# Patient Record
Sex: FEMALE | Race: WHITE | Employment: UNEMPLOYED | RURAL
[De-identification: names, ages, dates, MRNs, and addresses within clinical notes are randomized per-mention and may not be internally consistent; named-entity substitution may affect disease eponyms.]

---

## 2018-04-04 ENCOUNTER — TELEPHONE (OUTPATIENT)
Dept: FAMILY MEDICINE CLINIC | Age: 33
End: 2018-04-04

## 2018-04-04 ENCOUNTER — OFFICE VISIT (OUTPATIENT)
Dept: FAMILY MEDICINE CLINIC | Age: 33
End: 2018-04-04

## 2018-04-04 VITALS
DIASTOLIC BLOOD PRESSURE: 48 MMHG | SYSTOLIC BLOOD PRESSURE: 92 MMHG | WEIGHT: 128 LBS | HEIGHT: 62 IN | HEART RATE: 100 BPM | TEMPERATURE: 98.5 F | RESPIRATION RATE: 16 BRPM | OXYGEN SATURATION: 97 % | BODY MASS INDEX: 23.55 KG/M2

## 2018-04-04 DIAGNOSIS — J02.9 SORE THROAT: Primary | ICD-10-CM

## 2018-04-04 LAB
QUICKVUE INFLUENZA TEST: NORMAL
S PYO AG THROAT QL: NEGATIVE
VALID INTERNAL CONTROL?: YES
VALID INTERNAL CONTROL?: YES

## 2018-04-04 RX ORDER — BISMUTH SUBSALICYLATE 262 MG
1 TABLET,CHEWABLE ORAL DAILY
COMMUNITY
End: 2019-06-12

## 2018-04-04 NOTE — PROGRESS NOTES
Reviewed record in preparation for visit and have obtained necessary documentation. Patient did not bring medications to visit for review. Information provided on Advanced Directive, Living Will. Body mass index is 23.41 kg/(m^2).    Health Maintenance Due   Topic Date Due    DTaP/Tdap/Td series (1 - Tdap) 06/17/2008    PAP AKA CERVICAL CYTOLOGY  06/17/2008    Influenza Age 9 to Adult  08/01/2017

## 2018-04-04 NOTE — MR AVS SNAPSHOT
[address] [address] 
[phone] Patient: Ricardo Leonard MRN: [id number] Visit Information Date & Time Provider Department Dept. Phone Encounter #  
 4/4/2018 11:00 AM Omar Ku, MADDY [facility] [phone] [id number] Follow-up Instructions Return if symptoms worsen or fail to improve. Upcoming Health Maintenance Date Due DTaP/Tdap/Td series (1 - Tdap) 6/17/2008 PAP AKA CERVICAL CYTOLOGY 6/17/2008 Influenza Age 5 to Adult 8/1/2017 Allergies as of 4/4/2018  Review Complete On: 4/4/2018 By: Clifton West LPN No Known Allergies Current Immunizations  Reviewed on 9/27/2011 Name Date MMR 7/25/2015 11:15 AM  
  
 Not reviewed this visit You Were Diagnosed With   
  
 Codes Comments Sore throat    -  Primary ICD-10-CM: J02.9 ICD-9-CM: 039 Vitals BP Pulse Temp Resp Height(growth percentile) Weight(growth percentile) 92/48 (BP 1 Location: Left arm, BP Patient Position: Sitting) 100 98.5 °F (36.9 °C) (Oral) 16 5' 2\" (1.575 m) 128 lb (58.1 kg) SpO2 BMI OB Status Smoking Status 97% 23.41 kg/m2 Recent pregnancy Never Smoker Vitals History BMI and BSA Data Body Mass Index Body Surface Area  
 23.41 kg/m 2 1.59 m 2 Preferred Pharmacy Pharmacy Name Phone [address] [address] [phone] Your Updated Medication List  
  
   
This list is accurate as of 4/4/18 11:36 AM.  Always use your most recent med list.  
  
  
  
  
 ibuprofen 800 mg tablet Commonly known as:  MOTRIN Take 1 Tab by mouth every eight (8) hours. multivitamin tablet Commonly known as:  ONE A DAY Take 1 Tab by mouth daily. SUPHEDRINE 30 mg tablet Generic drug:  pseudoephedrine Take  by mouth every four (4) hours as needed. TYLENOL 325 mg tablet Generic drug:  acetaminophen Take  by mouth every four (4) hours as needed. We Performed the Following AMB POC RAPID INFLUENZA TEST [22938 CPT(R)] Follow-up Instructions Return if symptoms worsen or fail to improve. Patient Instructions A Healthy Lifestyle: Care Instructions Your Care Instructions A healthy lifestyle can help you feel good, stay at a healthy weight, and have plenty of energy for both work and play. A healthy lifestyle is something you can share with your whole family. A healthy lifestyle also can lower your risk for serious health problems, such as high blood pressure, heart disease, and diabetes. You can follow a few steps listed below to improve your health and the health of your family. Follow-up care is a key part of your treatment and safety. Be sure to make and go to all appointments, and call your doctor if you are having problems. It's also a good idea to know your test results and keep a list of the medicines you take. How can you care for yourself at home? · Do not eat too much sugar, fat, or fast foods. You can still have dessert and treats now and then. The goal is moderation. · Start small to improve your eating habits. Pay attention to portion sizes, drink less juice and soda pop, and eat more fruits and vegetables. ¨ Eat a healthy amount of food. A 3-ounce serving of meat, for example, is about the size of a deck of cards. Fill the rest of your plate with vegetables and whole grains. ¨ Limit the amount of soda and sports drinks you have every day. Drink more water when you are thirsty. ¨ Eat at least 5 servings of fruits and vegetables every day. It may seem like a lot, but it is not hard to reach this goal. A serving or helping is 1 piece of fruit, 1 cup of vegetables, or 2 cups of leafy, raw vegetables.  Have an apple or some carrot sticks as an afternoon snack instead of a candy bar. Try to have fruits and/or vegetables at every meal. 
· Make exercise part of your daily routine. You may want to start with simple activities, such as walking, bicycling, or slow swimming. Try to be active 30 to 60 minutes every day. You do not need to do all 30 to 60 minutes all at once. For example, you can exercise 3 times a day for 10 or 20 minutes. Moderate exercise is safe for most people, but it is always a good idea to talk to your doctor before starting an exercise program. 
· Keep moving. Ema Mass the lawn, work in the garden, or [employer]. Take the stairs instead of the elevator at work. · If you smoke, quit. People who smoke have an increased risk for heart attack, stroke, cancer, and other lung illnesses. Quitting is hard, but there are ways to boost your chance of quitting tobacco for good. ¨ Use nicotine gum, patches, or lozenges. ¨ Ask your doctor about stop-smoking programs and medicines. ¨ Keep trying. In addition to reducing your risk of diseases in the future, you will notice some benefits soon after you stop using tobacco. If you have shortness of breath or asthma symptoms, they will likely get better within a few weeks after you quit. · Limit how much alcohol you drink. Moderate amounts of alcohol (up to 2 drinks a day for men, 1 drink a day for women) are okay. But drinking too much can lead to liver problems, high blood pressure, and other health problems. Family health If you have a family, there are many things you can do together to improve your health. · Eat meals together as a family as often as possible. · Eat healthy foods. This includes fruits, vegetables, lean meats and dairy, and whole grains. · Include your family in your fitness plan. Most people think of activities such as jogging or tennis as the way to fitness, but there are many ways you and your family can be more active.  Anything that makes you breathe hard and gets your heart pumping is exercise. Here are some tips: 
¨ Walk to do errands or to take your child to school or the bus. ¨ Go for a family bike ride after dinner instead of watching TV. Where can you learn more? Go to http://carla-danie.info/. Enter K028 in the search box to learn more about \"A Healthy Lifestyle: Care Instructions. \" Current as of: May 12, 2017 Content Version: 11.4 © 4935-6120 [employer]. Care instructions adapted under license by [employer] (which disclaims liability or warranty for this information). If you have questions about a medical condition or this instruction, always ask your healthcare professional. [address] any warranty or liability for your use of this information. Rapid Strep Test: About This Test 
What is it? A rapid strep test checks the bacteria in your throat to see if strep is the cause of your sore throat. Why is this test done? It may be done so your doctor can find out right away whether you have strep throat. There is another test for strep, called a throat culture, but that test takes a few days to get the results. How can you prepare for the test? 
You don't need to do anything before you have this test. 
What happens during the test? 
· You will be asked to tilt your head back and open your mouth as wide as possible. · Your doctor will press your tongue down with a flat stick (tongue depressor) and then examine your mouth and throat. · A clean cotton swab will be rubbed over the back of your throat, around your tonsils, and over any red areas or sores to collect a sample. How long does the test take? · The test takes less than a minute. · Results are available in 10 to 15 minutes. Follow-up care is a key part of your treatment and safety.  Be sure to make and go to all appointments, and call your doctor if you are having problems. It's also a good idea to keep a list of the medicines you take. Ask your doctor when you can expect to have your test results. Where can you learn more? Go to http://carla-danie.info/. Enter B356 in the search box to learn more about \"Rapid Strep Test: About This Test.\" Current as of: May 12, 2017 Content Version: 11.4 © 8999-7382 [employer]. Care instructions adapted under license by [employer] (which disclaims liability or warranty for this information). If you have questions about a medical condition or this instruction, always ask your healthcare professional. [address] any warranty or liability for your use of this information. Supportive therapy-Tylenol, increase fluids, warm beverages and throat lozenges. If you continue to feel lightheaded please go to the nearest Emergency Room for possible IV fluids. Introducing [facility]! [facility] introduces Movetis patient portal. Now you can access parts of your medical record, email your doctor's office, and request medication refills online. 1. In your internet browser, go to [email or web address] 2. Click on the First Time User? Click Here link in the Sign In box. You will see the New Member Sign Up page. 3. Enter your Movetis Access Code exactly as it appears below. You will not need to use this code after youve completed the sign-up process. If you do not sign up before the expiration date, you must request a new code. · Movetis Access Code: [email or web address] Expires: 7/3/2018 11:36 AM 
 
4. Enter the last four digits of your Social Security Number (xxxx) and Date of Birth (mm/dd/yyyy) as indicated and click Submit. You will be taken to the next sign-up page. 5. Create a Movetis ID. This will be your Movetis login ID and cannot be changed, so think of one that is secure and easy to remember. 6. Create a ShopWiki password. You can change your password at any time. 7. Enter your Password Reset Question and Answer. This can be used at a later time if you forget your password. 8. Enter your e-mail address. You will receive e-mail notification when new information is available in [address] 9. Click Sign Up. You can now view and download portions of your medical record. 10. Click the Download Summary menu link to download a portable copy of your medical information. If you have questions, please visit the Frequently Asked Questions section of the ShopWiki website. Remember, ShopWiki is NOT to be used for urgent needs. For medical emergencies, dial 911. Now available from your iPhone and Android! Please provide this summary of care documentation to your next provider. Your primary care clinician is listed as Phys Other. If you have any questions after today's visit, please call [phone].

## 2018-04-04 NOTE — PATIENT INSTRUCTIONS
A Healthy Lifestyle: Care Instructions  Your Care Instructions    A healthy lifestyle can help you feel good, stay at a healthy weight, and have plenty of energy for both work and play. A healthy lifestyle is something you can share with your whole family. A healthy lifestyle also can lower your risk for serious health problems, such as high blood pressure, heart disease, and diabetes. You can follow a few steps listed below to improve your health and the health of your family. Follow-up care is a key part of your treatment and safety. Be sure to make and go to all appointments, and call your doctor if you are having problems. It's also a good idea to know your test results and keep a list of the medicines you take. How can you care for yourself at home? · Do not eat too much sugar, fat, or fast foods. You can still have dessert and treats now and then. The goal is moderation. · Start small to improve your eating habits. Pay attention to portion sizes, drink less juice and soda pop, and eat more fruits and vegetables. ¨ Eat a healthy amount of food. A 3-ounce serving of meat, for example, is about the size of a deck of cards. Fill the rest of your plate with vegetables and whole grains. ¨ Limit the amount of soda and sports drinks you have every day. Drink more water when you are thirsty. ¨ Eat at least 5 servings of fruits and vegetables every day. It may seem like a lot, but it is not hard to reach this goal. A serving or helping is 1 piece of fruit, 1 cup of vegetables, or 2 cups of leafy, raw vegetables. Have an apple or some carrot sticks as an afternoon snack instead of a candy bar. Try to have fruits and/or vegetables at every meal.  · Make exercise part of your daily routine. You may want to start with simple activities, such as walking, bicycling, or slow swimming. Try to be active 30 to 60 minutes every day. You do not need to do all 30 to 60 minutes all at once.  For example, you can exercise 3 times a day for 10 or 20 minutes. Moderate exercise is safe for most people, but it is always a good idea to talk to your doctor before starting an exercise program.  · Keep moving. Deadra Rosendo the lawn, work in the garden, or [employer]. Take the stairs instead of the elevator at work. · If you smoke, quit. People who smoke have an increased risk for heart attack, stroke, cancer, and other lung illnesses. Quitting is hard, but there are ways to boost your chance of quitting tobacco for good. ¨ Use nicotine gum, patches, or lozenges. ¨ Ask your doctor about stop-smoking programs and medicines. ¨ Keep trying. In addition to reducing your risk of diseases in the future, you will notice some benefits soon after you stop using tobacco. If you have shortness of breath or asthma symptoms, they will likely get better within a few weeks after you quit. · Limit how much alcohol you drink. Moderate amounts of alcohol (up to 2 drinks a day for men, 1 drink a day for women) are okay. But drinking too much can lead to liver problems, high blood pressure, and other health problems. Family health  If you have a family, there are many things you can do together to improve your health. · Eat meals together as a family as often as possible. · Eat healthy foods. This includes fruits, vegetables, lean meats and dairy, and whole grains. · Include your family in your fitness plan. Most people think of activities such as jogging or tennis as the way to fitness, but there are many ways you and your family can be more active. Anything that makes you breathe hard and gets your heart pumping is exercise. Here are some tips:  ¨ Walk to do errands or to take your child to school or the bus. ¨ Go for a family bike ride after dinner instead of watching TV. Where can you learn more? Go to http://carla-danie.info/. Enter T013 in the search box to learn more about \"A Healthy Lifestyle: Care Instructions. \"  Current as of: May 12, 2017  Content Version: 11.4  © 1426-0698 [employer]. Care instructions adapted under license by [employer] (which disclaims liability or warranty for this information). If you have questions about a medical condition or this instruction, always ask your healthcare professional. [address] any warranty or liability for your use of this information. Rapid Strep Test: About This Test  What is it? A rapid strep test checks the bacteria in your throat to see if strep is the cause of your sore throat. Why is this test done? It may be done so your doctor can find out right away whether you have strep throat. There is another test for strep, called a throat culture, but that test takes a few days to get the results. How can you prepare for the test?  You don't need to do anything before you have this test.  What happens during the test?  · You will be asked to tilt your head back and open your mouth as wide as possible. · Your doctor will press your tongue down with a flat stick (tongue depressor) and then examine your mouth and throat. · A clean cotton swab will be rubbed over the back of your throat, around your tonsils, and over any red areas or sores to collect a sample. How long does the test take? · The test takes less than a minute. · Results are available in 10 to 15 minutes. Follow-up care is a key part of your treatment and safety. Be sure to make and go to all appointments, and call your doctor if you are having problems. It's also a good idea to keep a list of the medicines you take. Ask your doctor when you can expect to have your test results. Where can you learn more? Go to http://carla-danie.info/. Enter B356 in the search box to learn more about \"Rapid Strep Test: About This Test.\"  Current as of: May 12, 2017  Content Version: 11.4  © 2006-2017 [employer].  Care instructions adapted under license by [address] (which disclaims liability or warranty for this information). If you have questions about a medical condition or this instruction, always ask your healthcare professional. [address] any warranty or liability for your use of this information. Supportive therapy-Tylenol, increase fluids, warm beverages and throat lozenges. If you continue to feel lightheaded please go to the nearest Emergency Room for possible IV fluids.

## 2018-04-04 NOTE — PROGRESS NOTES
Drew Patrick  29 y.o. female  1985  [facility] [address]  [id number]     [facility]: Progress Note       Encounter Date: 4/4/2018    Chief Complaint   Patient presents with    Sore Throat     History of Present Illness   Drew Patrick is a 29 y.o. female who presents to clinic today for:  Feeling lightheaded, sore throat, h/a. LMP 3/25/18 and continues to breastfeed. Had a common cold ~1 week ago and got better. OTC-Tylenol and home remedies. She states she had some left over Amoxicillin and she took ~5 doses. Denies-SOB, chest pain (palpitations/arrhymias), abdominal pain, rash, LE edema, vision changes, fainting/falls, dizziness. BP noted-patient states she is eating and drinking. Discussed ED precautions for potential IV fluids. Health Maintenance    Health Maintenance Due   Topic Date Due    DTaP/Tdap/Td series (1 - Tdap) 06/17/2008    PAP AKA CERVICAL CYTOLOGY  06/17/2008    Influenza Age 5 to Adult  08/01/2017     Review of Systems   Review of Systems   Constitutional: Negative. HENT: Positive for sore throat. Eyes: Negative. Respiratory: Negative. Cardiovascular: Negative. Gastrointestinal: Negative. Genitourinary: Negative. Musculoskeletal: Negative. Skin: Negative. Neurological: Positive for headaches. Endo/Heme/Allergies: Negative. Psychiatric/Behavioral: Negative. Vitals/Objective:     Vitals:    04/04/18 1104   BP: 92/48   Pulse: 100   Resp: 16   Temp: 98.5 °F (36.9 °C)   TempSrc: Oral   SpO2: 97%   Weight: 128 lb (58.1 kg)   Height: 5' 2\" (1.575 m)     Body mass index is 23.41 kg/(m^2). Physical Exam   Constitutional: She is oriented to person, place, and time. She appears well-developed and well-nourished. She is active and cooperative. HENT:   Head: Normocephalic. Nose: Nose normal.   Mouth/Throat: Uvula is midline. Mucous membranes are dry. Posterior oropharyngeal erythema present.        Eyes: Conjunctivae, EOM and lids are normal. Pupils are equal, round, and reactive to light. Neck: Trachea normal, normal range of motion, full passive range of motion without pain and phonation normal. Neck supple. No thyromegaly present. Cardiovascular: Normal rate, regular rhythm, S1 normal and normal pulses. Pulmonary/Chest: Effort normal and breath sounds normal.   Musculoskeletal: Normal range of motion. Lymphadenopathy:        Head (right side): No tonsillar adenopathy present. Head (left side): No tonsillar adenopathy present. Neurological: She is alert and oriented to person, place, and time. Skin: Skin is warm, dry and intact. Psychiatric: She has a normal mood and affect. Her speech is normal and behavior is normal. Judgment and thought content normal. Cognition and memory are normal.       Recent Results (from the past 24 hour(s))   AMB POC RAPID INFLUENZA TEST    Collection Time: 18 11:15 AM   Result Value Ref Range    VALID INTERNAL CONTROL POC Yes     QuickVue Influenza test Negative Negative     Assessment and Plan:   1. Sore throat    Rapid STREP negative. Discussed supportive therapy, fluids, throat lozenges, and tylenol. Discussed ED precautions for potential volume due to BP. I have discussed the diagnosis with the patient and the intended plan as seen in the above orders. she has expressed understanding. The patient has received an after-visit summary and questions were answered concerning future plans. I have discussed medication side effects and warnings with the patient as well. Electronically Signed: Brian Ohara NP     History/Allergies   Patients past medical, surgical and family histories were reviewed and updated. History reviewed. No pertinent past medical history.    Past Surgical History:   Procedure Laterality Date     DELIVERY ONLY      DELIVERY   07     Family History   Problem Relation Age of Onset    Migraines Maternal Uncle     Migraines Maternal Grandmother      Social History     Social History    Marital status:      Spouse name: N/A    Number of children: N/A    Years of education: N/A     Occupational History    Not on file. Social History Main Topics    Smoking status: Never Smoker    Smokeless tobacco: Never Used    Alcohol use No    Drug use: No    Sexual activity: Yes     Partners: Male     Birth control/ protection: None     Other Topics Concern    Not on file     Social History Narrative         No Known Allergies    Disposition     Follow-up Disposition:  Return if symptoms worsen or fail to improve. No future appointments. Current Medications after this visit     Current Outpatient Prescriptions   Medication Sig    multivitamin (ONE A DAY) tablet Take 1 Tab by mouth daily.  acetaminophen (TYLENOL) 325 mg tablet Take  by mouth every four (4) hours as needed.  ibuprofen (MOTRIN) 800 mg tablet Take 1 Tab by mouth every eight (8) hours.  pseudoephedrine (SUPHEDRINE) 30 mg tablet Take  by mouth every four (4) hours as needed. No current facility-administered medications for this visit. There are no discontinued medications.

## 2018-04-04 NOTE — TELEPHONE ENCOUNTER
Returned patient's call. She was concerned that an infection may be the cause of her low BP. Informed patient that should not be the case.  Advised patient to be sure to push fluids as instructed by Deena Gates NP.

## 2018-04-04 NOTE — TELEPHONE ENCOUNTER
----- Message from Dalia Weiss sent at 4/4/2018 12:54 PM EDT -----  Regarding: Dr. Jerrell Uribe / Telephone  Pt is requesting to speak to the nurse regarding her visit today.  Best contact: [phone]

## 2019-05-31 ENCOUNTER — OFFICE VISIT (OUTPATIENT)
Dept: FAMILY MEDICINE CLINIC | Age: 34
End: 2019-05-31

## 2019-05-31 VITALS
OXYGEN SATURATION: 97 % | HEIGHT: 62 IN | SYSTOLIC BLOOD PRESSURE: 97 MMHG | BODY MASS INDEX: 25.4 KG/M2 | DIASTOLIC BLOOD PRESSURE: 64 MMHG | HEART RATE: 77 BPM | WEIGHT: 138 LBS | TEMPERATURE: 98.7 F | RESPIRATION RATE: 16 BRPM

## 2019-05-31 DIAGNOSIS — S90.112A CONTUSION OF LEFT GREAT TOE WITHOUT DAMAGE TO NAIL, INITIAL ENCOUNTER: Primary | ICD-10-CM

## 2019-05-31 DIAGNOSIS — S99.922A INJURY OF TOE ON LEFT FOOT, INITIAL ENCOUNTER: ICD-10-CM

## 2019-05-31 LAB
HCG URINE, QL. (POC): NEGATIVE
VALID INTERNAL CONTROL?: YES

## 2019-05-31 RX ORDER — DICLOFENAC SODIUM 50 MG/1
50 TABLET, DELAYED RELEASE ORAL 2 TIMES DAILY
Qty: 14 TAB | Refills: 0 | Status: SHIPPED | OUTPATIENT
Start: 2019-05-31 | End: 2019-06-07

## 2019-05-31 NOTE — PROGRESS NOTES
[facility]idency Program     Outpatient Resident Progress Note    Encounter Date: 5/31/2019    Chief Complaint   Patient presents with    Toe Injury     left great toe       History of Present Illness    Patient is a 34 y.o. female, who presents to clinic for Toe Injury (left great toe)  Patient comes to the clinic after injuring the big toe of the left foot playing soccer Yesterday (5/30/2019). She received an impact by another player on the medial aspect of the toe. At the moment of the trauma, patient felt a pain of 10/10, Today is 7/10. She have been using Tylenol for pain relieve with mild effect. She have noticed swelling and brusing, but no redness, laceration, loss of movement, changes in sensation. Denies rash, fever, fatigue, dizziness, lightheadedness, headaches, changes in vision,CP, palpitations, SOB, nausea, vomiting, or any other complains at this moment. Review of Systems    A complete ROS was reviewed and only pertinent items documented on HPI. Allergies - reviewed:   No Known Allergies    Medications - reviewed:   Current Outpatient Medications   Medication Sig    diclofenac EC (VOLTAREN) 50 mg EC tablet Take 1 Tab by mouth two (2) times a day for 7 days.  multivitamin (ONE A DAY) tablet Take 1 Tab by mouth daily.  ibuprofen (MOTRIN) 800 mg tablet Take 1 Tab by mouth every eight (8) hours.  pseudoephedrine (SUPHEDRINE) 30 mg tablet Take  by mouth every four (4) hours as needed.  acetaminophen (TYLENOL) 325 mg tablet Take  by mouth every four (4) hours as needed. No current facility-administered medications for this visit. Past Medical History - reviewed:  History reviewed. No pertinent past medical history.     Family Medical History - reviewed:  Family History   Problem Relation Age of Onset    Migraines Maternal Uncle     Migraines Maternal Grandmother        Objective  Visit Vitals  BP 97/64 (BP 1 Location: Left arm, BP Patient Position: Sitting)   Pulse 77   Temp 98.7 °F (37.1 °C) (Oral)   Resp 16   Ht 5' 2\" (1.575 m)   Wt 138 lb (62.6 kg)   SpO2 97%   BMI 25.24 kg/m²     Body mass index is 25.24 kg/m². Nursing note and vitals reviewed. Physical Exam  Constitutional: Well-developed, well-nourished, and in no distress. Cardiovascular: Normal rate, regular rhythm, normal heart sounds and intact distal pulses. Exam reveals no gallop and no friction rub. No murmur heard. Pulmonary/Chest: Effort normal and breath sounds normal. No respiratory distress. No wheezes, no rales, no tenderness. Musculoskeletal: L big toe: Limited ROM of L big toe due to pain. Normal sensation and strength. Ecchymosis of the medial aspect of the distal toe with related tenderness and mild edema. No lacerations or erythema. Neurological: Alert and oriented to person, place, and time. Normal reflexes. No cranial nerve deficit. Gait normal. Coordination normal.   Skin: Skin is warm and dry. No rash noted. Not diaphoretic. No erythema. No pallor. POC UPT (-)    XR of the L Foot: No acute abnormalities. Will follow up Radiologist interpretation. Assessment / Plan   Ms. Kandis Paz is a 34 y.o. female with the following medical condition(s):    1. Contusion of left great toe without damage to nail, initial encounter  No evidence of fracture of the fallax of the L greater toe on XR. Recommended use of NSAID for pain relieve and inflammation reduction. - diclofenac EC (VOLTAREN) 50 mg EC tablet; Take 1 Tab by mouth two (2) times a day for 7 days. Dispense: 14 Tab; Refill: 0  - XR FOOT LT MIN 3 V; Future  - AMB POC URINE PREGNANCY TEST, VISUAL COLOR COMPARISON  - diclofenac EC (VOLTAREN) 50 mg EC tablet; Take 1 Tab by mouth two (2) times a day for 7 days. Dispense: 14 Tab; Refill: 0      Patient was advised to return to clinic in case of worsening of symptoms or fail to improve.  Life threatening signs and symptoms were discussed and patient advised to go to the nearest ED for prompt evaluation and care in case of onset of any of these. · I have discussed the diagnosis with the patient and the intended plan as seen in the above orders. The patient has received an after-visit summary and questions were answered concerning future plans. I have discussed medication side effects and warnings with the patient as well.       Patient/Plan discussed with Dr. Nilam Victor (Attending Physician)      Rosa Fabian MD  PGY-3 Family Medicine Resident  Encounter Date: 5/31/2019

## 2019-05-31 NOTE — PROGRESS NOTES
1. Have you been to the ER, urgent care clinic since your last visit? Hospitalized since your last visit? No    2. Have you seen or consulted any other health care providers outside of the [address] [place] [address] since your last visit? Include any pap smears or colon screening.  No  Reviewed record in preparation for visit and have necessary documentation  Pt did not bring medication to office visit for review    Goals that were addressed and/or need to be completed during or after this appointment include   Health Maintenance Due   Topic Date Due    DTaP/Tdap/Td series (1 - Tdap) 06/17/2008    PAP AKA CERVICAL CYTOLOGY  06/17/2008

## 2019-05-31 NOTE — PATIENT INSTRUCTIONS
Contusion: Care Instructions  Your Care Instructions    Contusion is the medical term for a bruise. It is the result of a direct blow or an impact, such as a fall. Contusions are common sports injuries. Most people think of a bruise as a black-and-blue spot. This happens when small blood vessels get torn and leak blood under the skin. But bones, muscles, and organs can also get bruised. This may damage deep tissues but not cause a bruise you can see. The doctor will do a physical exam to find the location of your contusion. You may also have tests to make sure you do not have a more serious injury, such as a broken bone or nerve damage. These may include X-rays or other imaging tests like a CT scan or MRI. Deep-tissue contusions may cause pain and swelling. But if there is no serious damage, they will often get better in a few weeks with home treatment. The doctor has checked you carefully, but problems can develop later. If you notice any problems or new symptoms, get medical treatment right away. Follow-up care is a key part of your treatment and safety. Be sure to make and go to all appointments, and call your doctor if you are having problems. It's also a good idea to know your test results and keep a list of the medicines you take. How can you care for yourself at home? · Put ice or a cold pack on the sore area for 10 to 20 minutes at a time to stop swelling. Put a thin cloth between the ice pack and your skin. · Be safe with medicines. Read and follow all instructions on the label. ? If the doctor gave you a prescription medicine for pain, take it as prescribed. ? If you are not taking a prescription pain medicine, ask your doctor if you can take an over-the-counter medicine. · If you can, prop up the sore area on pillows as much as possible for the next few days. Try to keep the sore area above the level of your heart. When should you call for help?   Call your doctor now or seek immediate medical care if:    · Your pain gets worse.     · You have new or worse swelling.     · You have tingling, weakness, or numbness in the area near the contusion.     · The area near the contusion is cold or pale.    Watch closely for changes in your health, and be sure to contact your doctor if:    · You do not get better as expected. Where can you learn more? Go to http://carla-danie.info/. Enter J188 in the search box to learn more about \"Contusion: Care Instructions. \"  Current as of: September 23, 2018  Content Version: 11.9  © 5870-6917 [employer]. Care instructions adapted under license by [employer] (which disclaims liability or warranty for this information). If you have questions about a medical condition or this instruction, always ask your healthcare professional. [address] any warranty or liability for your use of this information.

## 2019-06-27 ENCOUNTER — OFFICE VISIT (OUTPATIENT)
Dept: FAMILY MEDICINE CLINIC | Age: 34
End: 2019-06-27

## 2019-06-27 VITALS
BODY MASS INDEX: 24.11 KG/M2 | RESPIRATION RATE: 16 BRPM | HEART RATE: 76 BPM | TEMPERATURE: 97.2 F | HEIGHT: 62 IN | WEIGHT: 131 LBS | DIASTOLIC BLOOD PRESSURE: 66 MMHG | SYSTOLIC BLOOD PRESSURE: 96 MMHG

## 2019-06-27 DIAGNOSIS — R39.9 URINARY SYMPTOM OR SIGN: Primary | ICD-10-CM

## 2019-06-27 LAB
BILIRUB UR QL STRIP: NEGATIVE
GLUCOSE UR-MCNC: NEGATIVE MG/DL
KETONES P FAST UR STRIP-MCNC: NEGATIVE MG/DL
PH UR STRIP: 7 [PH] (ref 4.6–8)
PROT UR QL STRIP: NORMAL
SP GR UR STRIP: 1.01 (ref 1–1.03)
UA UROBILINOGEN AMB POC: NORMAL (ref 0.2–1)
URINALYSIS CLARITY POC: CLEAR
URINALYSIS COLOR POC: NORMAL
URINE BLOOD POC: NORMAL
URINE LEUKOCYTES POC: NEGATIVE
URINE NITRITES POC: NEGATIVE

## 2019-06-27 NOTE — PROGRESS NOTES
Subjective  CC: Mary Ruano is an 30 y.o. female presents for urinary concern    She states that she is having an increase in urination x4 days. She had burning x1 day (start of symptoms) but has not had that any in the last couple of days. She has not had fevers, back pain. Has not noticed blood in the urine. Allergies - reviewed:   No Known Allergies      Medications - reviewed:   No current outpatient medications on file. No current facility-administered medications for this visit. Past Medical History - reviewed:  History reviewed. No pertinent past medical history. Immunizations - reviewed:   Immunization History   Administered Date(s) Administered    MMR 07/25/2015         ROS  Review of Systems : A review of systems was performed. All pertinent negatives and positives are mentioned in the HPI. Physical Exam  Visit Vitals  BP 96/66 (BP 1 Location: Left arm, BP Patient Position: Sitting)   Pulse 76   Temp 97.2 °F (36.2 °C) (Oral)   Resp 16   Ht 5' 2\" (1.575 m)   Wt 131 lb (59.4 kg)   BMI 23.96 kg/m²       General appearance - Alert, NAD. Head: Atraumatic. Normocephalic. No lymphadenopathy  Eyes: EOMI. Sclera white. Respiratory - LCTAB. No wheeze/rale/rhonchi  Heart - Normal rate, regular rhythm. No m/r/r  Abdomen - Soft, non tender. No suprapubic tenderness. Non distended. No CVA tenderness  Neurological - No focal deficits. Speech normal.   Extremities - No LE edema. Distal pulses intact  Skin - normal coloration and normal turgor. No cyanosis, no rash. Assessment/Plan  1. Urinary symptom or sign - UA with blood, pt currently menstruating, otherwise negative for signs of infection. Could possibly 2/2 to primary polydipsia. Advised pt to monitor for signs of UTI including dysuria, odor, blood, fever etc.  - AMB POC URINALYSIS DIP STICK AUTO W/O MICRO      I have discussed the aforementioned diagnoses and plan with the patient in detail.  I have provided information in person and/or in AVS. All questions answered prior to discharge.     Cleve Melendrez MD  Family Medicine Resident

## 2019-06-27 NOTE — PROGRESS NOTES
I discussed the findings, assessment and plan in detail with the resident and agree with the resident's findings and plan as documented in the resident's note. Talia Ang M.D.

## 2019-06-27 NOTE — PATIENT INSTRUCTIONS
Monitor for signs of infection including burning with urination, blood in the urine, odor, or vaginal discharge. Urinary Tract Infection in Women: Care Instructions  Your Care Instructions    A urinary tract infection, or UTI, is a general term for an infection anywhere between the kidneys and the urethra (where urine comes out). Most UTIs are bladder infections. They often cause pain or burning when you urinate. UTIs are caused by bacteria and can be cured with antibiotics. Be sure to complete your treatment so that the infection goes away. Follow-up care is a key part of your treatment and safety. Be sure to make and go to all appointments, and call your doctor if you are having problems. It's also a good idea to know your test results and keep a list of the medicines you take. How can you care for yourself at home? · Take your antibiotics as directed. Do not stop taking them just because you feel better. You need to take the full course of antibiotics. · Drink extra water and other fluids for the next day or two. This may help wash out the bacteria that are causing the infection. (If you have kidney, heart, or liver disease and have to limit fluids, talk with your doctor before you increase your fluid intake.)  · Avoid drinks that are carbonated or have caffeine. They can irritate the bladder. · Urinate often. Try to empty your bladder each time. · To relieve pain, take a hot bath or lay a heating pad set on low over your lower belly or genital area. Never go to sleep with a heating pad in place. To prevent UTIs  · Drink plenty of water each day. This helps you urinate often, which clears bacteria from your system. (If you have kidney, heart, or liver disease and have to limit fluids, talk with your doctor before you increase your fluid intake.)  · Urinate when you need to. · Urinate right after you have sex. · Change sanitary pads often.   · Avoid douches, bubble baths, feminine hygiene sprays, and other feminine hygiene products that have deodorants. · After going to the bathroom, wipe from front to back. When should you call for help? Call your doctor now or seek immediate medical care if:    · Symptoms such as fever, chills, nausea, or vomiting get worse or appear for the first time.     · You have new pain in your back just below your rib cage. This is called flank pain.     · There is new blood or pus in your urine.     · You have any problems with your antibiotic medicine.    Watch closely for changes in your health, and be sure to contact your doctor if:    · You are not getting better after taking an antibiotic for 2 days.     · Your symptoms go away but then come back. Where can you learn more? Go to http://carla-danie.info/. Enter A020 in the search box to learn more about \"Urinary Tract Infection in Women: Care Instructions. \"  Current as of: March 20, 2018  Content Version: 11.9  © 4412-2327 [employer], [employer]. Care instructions adapted under license by [employer] (which disclaims liability or warranty for this information). If you have questions about a medical condition or this instruction, always ask your healthcare professional. [address] any warranty or liability for your use of this information.

## 2019-06-27 NOTE — PROGRESS NOTES
1. Have you been to the ER, urgent care clinic since your last visit? Hospitalized since your last visit? No    2. Have you seen or consulted any other health care providers outside of the [address] [place] [address] since your last visit? Include any pap smears or colon screening.  No  Reviewed record in preparation for visit and have necessary documentation  Pt did not bring medication to office visit for review    Goals that were addressed and/or need to be completed during or after this appointment include   Health Maintenance Due   Topic Date Due    DTaP/Tdap/Td series (1 - Tdap) 06/17/2008    PAP AKA CERVICAL CYTOLOGY  06/17/2008

## 2019-07-09 ENCOUNTER — DOCUMENTATION ONLY (OUTPATIENT)
Dept: FAMILY MEDICINE CLINIC | Age: 34
End: 2019-07-09

## 2020-02-12 RX ORDER — OSELTAMIVIR PHOSPHATE 75 MG/1
75 CAPSULE ORAL DAILY
Qty: 10 CAP | Refills: 0 | Status: SHIPPED | OUTPATIENT
Start: 2020-02-12 | End: 2020-02-22

## 2020-03-06 ENCOUNTER — OFFICE VISIT (OUTPATIENT)
Dept: FAMILY MEDICINE CLINIC | Age: 35
End: 2020-03-06

## 2020-03-06 ENCOUNTER — HOSPITAL ENCOUNTER (OUTPATIENT)
Dept: LAB | Age: 35
Discharge: HOME | End: 2020-03-06

## 2020-03-06 VITALS
SYSTOLIC BLOOD PRESSURE: 110 MMHG | HEART RATE: 103 BPM | TEMPERATURE: 98.8 F | RESPIRATION RATE: 16 BRPM | HEIGHT: 62 IN | WEIGHT: 145 LBS | BODY MASS INDEX: 26.68 KG/M2 | OXYGEN SATURATION: 98 % | DIASTOLIC BLOOD PRESSURE: 59 MMHG

## 2020-03-06 DIAGNOSIS — R35.0 FREQUENCY OF URINATION: ICD-10-CM

## 2020-03-06 DIAGNOSIS — R35.0 FREQUENCY OF URINATION: Primary | ICD-10-CM

## 2020-03-06 LAB
ALBUMIN SERPL-MCNC: 4 G/DL (ref 3.5–5)
ALBUMIN/GLOB SERPL: 1.3 {RATIO} (ref 1.1–2.2)
ALP SERPL-CCNC: 54 U/L (ref 45–117)
ALT SERPL-CCNC: 19 U/L (ref 12–78)
ANION GAP SERPL CALC-SCNC: 2 MMOL/L (ref 5–15)
AST SERPL-CCNC: 6 U/L (ref 15–37)
BILIRUB SERPL-MCNC: 0.4 MG/DL (ref 0.2–1)
BILIRUB UR QL STRIP: NEGATIVE
BUN SERPL-MCNC: 12 MG/DL (ref 6–20)
BUN/CREAT SERPL: 17 (ref 12–20)
CALCIUM SERPL-MCNC: 9.3 MG/DL (ref 8.5–10.1)
CHLORIDE SERPL-SCNC: 107 MMOL/L (ref 97–108)
CO2 SERPL-SCNC: 28 MMOL/L (ref 21–32)
CREAT SERPL-MCNC: 0.7 MG/DL (ref 0.55–1.02)
GLOBULIN SER CALC-MCNC: 3.2 G/DL (ref 2–4)
GLUCOSE SERPL-MCNC: 74 MG/DL (ref 65–100)
GLUCOSE UR-MCNC: NEGATIVE MG/DL
KETONES P FAST UR STRIP-MCNC: NORMAL MG/DL
PH UR STRIP: 5.5 [PH] (ref 4.6–8)
POTASSIUM SERPL-SCNC: 4 MMOL/L (ref 3.5–5.1)
PROT SERPL-MCNC: 7.2 G/DL (ref 6.4–8.2)
PROT UR QL STRIP: NEGATIVE
SODIUM SERPL-SCNC: 137 MMOL/L (ref 136–145)
SP GR UR STRIP: 1.03 (ref 1–1.03)
UA UROBILINOGEN AMB POC: NORMAL (ref 0.2–1)
URINALYSIS CLARITY POC: CLEAR
URINALYSIS COLOR POC: YELLOW
URINE BLOOD POC: NEGATIVE
URINE LEUKOCYTES POC: NORMAL
URINE NITRITES POC: NEGATIVE

## 2020-03-06 NOTE — PROGRESS NOTES
Subjective  CC: Reena Ward is an 30 y.o. female increased urination. Patient presents for dysuria. Symptoms started a couple of weeks. Urinary urgency: not present  Urinary frequency: present    Denies increase in thirst or increase in appetite. No blood in urine, cloudiness, odor, N/V/D. History of UTI?: has not had UTI in recent past    Allergies - reviewed:   No Known Allergies      Medications - reviewed:   No current outpatient medications on file. No current facility-administered medications for this visit. Past Medical History - reviewed:  History reviewed. No pertinent past medical history. Immunizations - reviewed:   Immunization History   Administered Date(s) Administered    MMR 07/25/2015         ROS  Review of Systems : A complete review of systems as performed and is negative except for those mentioned in the HPI. Physical Exam  Visit Vitals  /59 (BP 1 Location: Left arm, BP Patient Position: Sitting)   Pulse (!) 103   Temp 98.8 °F (37.1 °C) (Oral)   Resp 16   Ht 5' 2\" (1.575 m)   Wt 145 lb (65.8 kg)   SpO2 98%   BMI 26.52 kg/m²       General appearance - Alert, NAD. Head: Atraumatic. Normocephalic. No lymphadenopathy  Eyes: EOMI. Sclera white. Throat: pharynx clear, no exudates. Respiratory - LCTAB. No wheeze/rale/rhonchi  Heart - Normal rate, regular rhythm. No m/r/r  Abdomen - Soft, non tender. Non distended. No suprapubic tenderness or CVA tenderness. Extremities - No LE edema. Distal pulses intact  Skin - normal coloration and normal turgor. No cyanosis, no rash. Assessment/Plan  1. Frequency of urination - UA with presence of 1+ LE. Discussed with pt that this alone does not indicatively rule in UTI. Will send for culture. Other etiologies for increase in urination include DM and bladder irritability. Well check CMP to evaluate renal function and glucose level (pt had breakfast). Have discussed urinary symptom treatment with uristat.   - CULTURE, URINE; Future  - AMB POC URINALYSIS DIP STICK AUTO W/O MICRO  - METABOLIC PANEL, COMPREHENSIVE; Future      I discussed the aforementioned diagnoses with the patient as well as the plan of care.      Paul Skelton MD  Family Medicine Resident  PGY 3

## 2020-03-06 NOTE — PATIENT INSTRUCTIONS
Frequent Urination: Care Instructions Your Care Instructions An urge to urinate frequently but usually passing only small amounts of urine is a common symptom of urinary problems, such as urinary tract infections. The bladder may become inflamed. This can cause the urge to urinate. You may try to urinate more often than usual to try to soothe that urge. Frequent urination also may be caused by sexually transmitted infections (STIs) or kidney stones. Or it may happen when something irritates the tube that carries urine from the bladder to the outside of the body (urethra). It may also be a sign of diabetes. The cause may be hard to find. You may need tests. Follow-up care is a key part of your treatment and safety. Be sure to make and go to all appointments, and call your doctor if you are having problems. It's also a good idea to know your test results and keep a list of the medicines you take. How can you care for yourself at home? · Drink extra water for the next day or two. This will help make the urine less concentrated. (If you have kidney, heart, or liver disease and have to limit fluids, talk with your doctor before you increase the amount of fluids you drink.) · Avoid drinks that are carbonated or have caffeine. They can irritate the bladder. For women: · Urinate right after you have sex. · After you go to the bathroom, wipe from front to back. · Avoid douches, bubble baths, and feminine hygiene sprays. And avoid other feminine hygiene products that have deodorants. When should you call for help? Call your doctor now or seek immediate medical care if: 
  · You have new symptoms, such as fever, nausea, or vomiting.  
  · You have new or worse symptoms of a urinary problem. For example: ? You have blood or pus in your urine. ? You have chills or body aches. ? It hurts to urinate. ? You have groin or belly pain. ? You have pain in your back just below your rib cage (the flank area).  Watch closely for changes in your health, and be sure to contact your doctor if you feel thirstier than usual. 
Where can you learn more? Go to http://carla-danie.info/. Enter 707 9441 in the search box to learn more about \"Frequent Urination: Care Instructions. \" Current as of: December 19, 2018 Content Version: 12.2 © 3070-1844 [employer]. Care instructions adapted under license by [employer] (which disclaims liability or warranty for this information). If you have questions about a medical condition or this instruction, always ask your healthcare professional. [place] [address] any warranty or liability for your use of this information.

## 2020-03-06 NOTE — PROGRESS NOTES
1. Have you been to the ER, urgent care clinic since your last visit? Hospitalized since your last visit? No    2. Have you seen or consulted any other health care providers outside of the [address] [place] [address] since your last visit? Include any pap smears or colon screening.  No  Reviewed record in preparation for visit and have necessary documentation  Pt did not bring medication to office visit for review    Goals that were addressed and/or need to be completed during or after this appointment include   Health Maintenance Due   Topic Date Due    DTaP/Tdap/Td series (1 - Tdap) 06/17/1998    Influenza Age 5 to Adult  08/01/2019

## 2020-03-08 LAB
BACTERIA SPEC CULT: NORMAL
SERVICE CMNT-IMP: NORMAL

## 2020-08-14 ENCOUNTER — OFFICE VISIT (OUTPATIENT)
Dept: FAMILY MEDICINE CLINIC | Age: 35
End: 2020-08-14

## 2020-08-14 ENCOUNTER — HOSPITAL ENCOUNTER (OUTPATIENT)
Dept: LAB | Age: 35
Discharge: HOME | End: 2020-08-14

## 2020-08-14 VITALS
BODY MASS INDEX: 27.23 KG/M2 | SYSTOLIC BLOOD PRESSURE: 105 MMHG | TEMPERATURE: 97.7 F | OXYGEN SATURATION: 100 % | HEART RATE: 99 BPM | DIASTOLIC BLOOD PRESSURE: 64 MMHG | WEIGHT: 148 LBS | RESPIRATION RATE: 20 BRPM | HEIGHT: 62 IN

## 2020-08-14 DIAGNOSIS — N94.9 VAGINAL BURNING: ICD-10-CM

## 2020-08-14 DIAGNOSIS — R35.0 URINARY FREQUENCY: ICD-10-CM

## 2020-08-14 DIAGNOSIS — Z00.00 WELLNESS EXAMINATION: ICD-10-CM

## 2020-08-14 DIAGNOSIS — Z00.00 WELLNESS EXAMINATION: Primary | ICD-10-CM

## 2020-08-14 LAB
APPEARANCE UR: CLEAR
BACTERIA URNS QL MICRO: NEGATIVE /HPF
BILIRUB UR QL: NEGATIVE
CHOLEST SERPL-MCNC: 138 MG/DL
COLOR UR: ABNORMAL
EPITH CASTS URNS QL MICRO: ABNORMAL /LPF
ERYTHROCYTE [DISTWIDTH] IN BLOOD BY AUTOMATED COUNT: 12.8 % (ref 11.5–14.5)
GLUCOSE UR STRIP.AUTO-MCNC: NEGATIVE MG/DL
HCT VFR BLD AUTO: 41.9 % (ref 35–47)
HDLC SERPL-MCNC: 58 MG/DL
HDLC SERPL: 2.4 {RATIO} (ref 0–5)
HGB BLD-MCNC: 12.8 G/DL (ref 11.5–16)
HGB UR QL STRIP: NEGATIVE
HYALINE CASTS URNS QL MICRO: ABNORMAL /LPF (ref 0–5)
KETONES UR QL STRIP.AUTO: NEGATIVE MG/DL
LDLC SERPL CALC-MCNC: 70 MG/DL (ref 0–100)
LEUKOCYTE ESTERASE UR QL STRIP.AUTO: NEGATIVE
LIPID PROFILE,FLP: NORMAL
MCH RBC QN AUTO: 29.2 PG (ref 26–34)
MCHC RBC AUTO-ENTMCNC: 30.5 G/DL (ref 30–36.5)
MCV RBC AUTO: 95.7 FL (ref 80–99)
NITRITE UR QL STRIP.AUTO: NEGATIVE
NRBC # BLD: 0 K/UL (ref 0–0.01)
NRBC BLD-RTO: 0 PER 100 WBC
PH UR STRIP: 8.5 [PH] (ref 5–8)
PLATELET # BLD AUTO: 277 K/UL (ref 150–400)
PMV BLD AUTO: 11.1 FL (ref 8.9–12.9)
PROT UR STRIP-MCNC: NEGATIVE MG/DL
RBC # BLD AUTO: 4.38 M/UL (ref 3.8–5.2)
RBC #/AREA URNS HPF: ABNORMAL /HPF (ref 0–5)
SP GR UR REFRACTOMETRY: 1.01 (ref 1–1.03)
TRIGL SERPL-MCNC: 50 MG/DL (ref ?–150)
UROBILINOGEN UR QL STRIP.AUTO: 0.2 EU/DL (ref 0.2–1)
VLDLC SERPL CALC-MCNC: 10 MG/DL
WBC # BLD AUTO: 5.4 K/UL (ref 3.6–11)
WBC URNS QL MICRO: ABNORMAL /HPF (ref 0–4)

## 2020-08-14 PROCEDURE — 99395 PREV VISIT EST AGE 18-39: CPT | Performed by: STUDENT IN AN ORGANIZED HEALTH CARE EDUCATION/TRAINING PROGRAM

## 2020-08-14 PROCEDURE — 88175 CYTOPATH C/V AUTO FLUID REDO: CPT

## 2020-08-14 PROCEDURE — 87624 HPV HI-RISK TYP POOLED RSLT: CPT

## 2020-08-14 NOTE — PROGRESS NOTES
1. Have you been to the ER, urgent care clinic since your last visit? Hospitalized since your last visit? No    2. Have you seen or consulted any other health care providers outside of the [address] [place] [address] since your last visit? Include any pap smears or colon screening. No    Reviewed record in preparation for visit and have necessary documentation  Goals that were addressed and/or need to be completed during or after this appointment include     Health Maintenance Due   Topic Date Due    DTaP/Tdap/Td series (1 - Tdap) 06/17/2008    Influenza Age 5 to Adult  08/01/2020       Patient is accompanied by self I have received verbal consent from Atif Zuleta to discuss any/all medical information while they are present in the room.

## 2020-08-14 NOTE — PATIENT INSTRUCTIONS

## 2020-08-14 NOTE — PROGRESS NOTES
ROUTINE ANNUAL WELL WOMAN    Sreedhar Ureña is a 37 y.o. female who presents to clinic today for annual physical exam.  She complains of irritation/itching/ burning sensation in her vaginal area that started last week Thursday. She used over the [employer], but that caused more irritation, so she stopped using it. She denies bloody vaginal discharge, dysuria, hematuria, abdominal pain, fever, shortness of breath, chest pain, changes in bowel habit. She endorses urinary frequency and urgency. Patient also complains of having a bump in her vaginal area, which she noticed a couple of days ago. Pt denies any history of STI's or STD's. Gyn History     Last menstrual period was: Today (2020)  Length of periods: 4-5 days  Number of days between periods: 28 days  Menstrual flow: Normal flow  Sexually active: Yes  Method of family planning: Patient is not on any birth control. Any family history of gyn cancers (breast, ovarian or cervical ca):  None    Past Medical History: None    Medications: None    Surgical History  Past Surgical History:   Procedure Laterality Date     DELIVERY ONLY      DELIVERY   07       Family History   Family History   Problem Relation Age of Onset    Migraines Maternal Uncle     Migraines Maternal Grandmother        Social History  Social History     Socioeconomic History    Marital status:      Spouse name: Not on file    Number of children: Not on file    Years of education: Not on file    Highest education level: Not on file   Occupational History    Not on file   Social Needs    Financial resource strain: Not on file    Food insecurity     Worry: Not on file     Inability: Not on file    Transportation needs     Medical: Not on file     Non-medical: Not on file   Tobacco Use    Smoking status: Never Smoker    Smokeless tobacco: Never Used   Substance and Sexual Activity    Alcohol use: No    Drug use: No    Sexual activity: Yes     Partners: Male     Birth control/protection: None   Lifestyle    Physical activity     Days per week: Not on file     Minutes per session: Not on file    Stress: Not on file   Relationships    Social connections     Talks on phone: Not on file     Gets together: Not on file     Attends Sabianism service: Not on file     Active member of club or organization: Not on file     Attends meetings of clubs or organizations: Not on file     Relationship status: Not on file    Intimate partner violence     Fear of current or ex partner: Not on file     Emotionally abused: Not on file     Physically abused: Not on file     Forced sexual activity: Not on file   Other Topics Concern    Not on file   Social History Narrative    Not on file     Patient denies any tobacco, alcohol or illicit drug use. Occupation: Stay at home mom       Objective   Vital Signs  Visit Vitals  /64 (BP 1 Location: Right arm, BP Patient Position: Sitting)   Pulse 99   Temp 97.7 °F (36.5 °C) (Oral)   Resp 20   Ht 5' 2\" (1.575 m)   Wt 148 lb (67.1 kg)   SpO2 100%   BMI 27.07 kg/m²       PHYSICAL EXAM   GEN: No apparent distress. Alert and oriented and responds to all questions appropriately. EYES:  Conjunctiva clear; pupils round and reactive to light; extraocular movements are intact. OROPHYARYNX: No oral lesions or exudates. NECK:  Supple; no masses; thyroid normal           LUNGS: Respirations unlabored; clear to auscultation bilaterally  CARDIOVASCULAR: Regular, rate, and rhythm without murmurs, gallops or rubs   ABDOMEN: Soft; nontender; nondistended; normoactive bowel sounds; no masses or organomegaly  PELVIC: normal external genitalia, vulva, cervix, minimal blood in the vaginal area, minimal vaginal tissue protrusion. NEUROLOGIC: No focal neurologic deficits. Coordination and gait grossly intact. EXT: Well perfused. No edema. SKIN: No obvious rashes    A/P    1.  Well woman visit:  Patient's last pap smear with HPV testing was in 2017. Patient would like another pap smear today due to concern for abnormality in her vaginal area. Pelvic exam showed normal external genitalia, vulva, cervix, minimal vaginal tissue protrusion was seen. · Follow up on lipid panel, cbc, and result of pap smear. Will call pt about result. · Return next year for another wellness visit. Patient counseled on coming to the clinic sooner if she has any concerns. 2. Urinary frequency: pt complains of urinary frequency and urgency. - Follow up on urinalysis result. I discussed the aforementioned diagnoses with the patient as well as the plan of care. All questions were answered and an AVS was provided.      I discussed this patient with Dr. Shefali De La Rosa  (Attending Physician)      Signed By:  Kvng Oseguera MD

## 2020-08-15 ENCOUNTER — TELEPHONE (OUTPATIENT)
Dept: FAMILY MEDICINE CLINIC | Age: 35
End: 2020-08-15

## 2020-08-15 NOTE — TELEPHONE ENCOUNTER
I contacted the patient in regards to her lab result. Normal Lipid panel, Normal CBC, no sign of Nitrites, Leukocyte esterase in urinalysis.

## 2021-10-19 ENCOUNTER — NURSE TRIAGE (OUTPATIENT)
Dept: OTHER | Facility: CLINIC | Age: 36
End: 2021-10-19

## 2021-10-19 NOTE — TELEPHONE ENCOUNTER
Reason for Disposition   [1] MILD-MODERATE pain AND [2] constant AND [3] present > 2 hours    Answer Assessment - Initial Assessment Questions  1. LOCATION: \"Where does it hurt? \"       Lower abdomen    2. RADIATION: \"Does the pain shoot anywhere else? \" (e.g., chest, back)      Denies    3. ONSET: \"When did the pain begin? \" (e.g., minutes, hours or days ago)       A couple of days ago    4. SUDDEN: \"Gradual or sudden onset? \"      Sudden    5. PATTERN \"Does the pain come and go, or is it constant? \"     - If constant: \"Is it getting better, staying the same, or worsening? \"       (Note: Constant means the pain never goes away completely; most serious pain is constant and it progresses)      - If intermittent: \"How long does it last?\" \"Do you have pain now? \"      (Note: Intermittent means the pain goes away completely between bouts)      At first it would come in the middle of the day, now it's constant    6. SEVERITY: \"How bad is the pain? \"  (e.g., Scale 1-10; mild, moderate, or severe)    - MILD (1-3): doesn't interfere with normal activities, abdomen soft and not tender to touch     - MODERATE (4-7): interferes with normal activities or awakens from sleep, tender to touch     - SEVERE (8-10): excruciating pain, doubled over, unable to do any normal activities       6/10    7. RECURRENT SYMPTOM: \"Have you ever had this type of abdominal pain before? \" If so, ask: \"When was the last time? \" and \"What happened that time? \"       No    8. CAUSE: \"What do you think is causing the abdominal pain? \"      In the evenings I get more bloating and gas    9. RELIEVING/AGGRAVATING FACTORS: \"What makes it better or worse? \" (e.g., movement, antacids, bowel movement)      After eating a big meal it can be worse. 10. OTHER SYMPTOMS: \"Has there been any vomiting, diarrhea, constipation, or urine problems? \"        Bloating,gas    11. PREGNANCY: \"Is there any chance you are pregnant? \" \"When was your last menstrual period? \"        No- LMP- a week ago    Protocols used: ABDOMINAL PAIN - FEMALE-ADULT-AH    Received call from Messi at [facility] with Red Flag Complaint. Brief description of triage: constant low abdominal, gas, bloating    Triage indicates for patient to be seen by [facility] within 4 hours. [facility] or ED if no appts available    Care advice provided, patient verbalizes understanding; denies any other questions or concerns; instructed to call back for any new or worsening symptoms. Writer provided warm transfer to [employer] at [facility] for appointment scheduling. Attention Provider: Thank you for allowing me to participate in the care of your patient. The patient was connected to triage in response to information provided to the ECC. Please do not respond through this encounter as the response is not directed to a shared pool.

## 2021-10-19 NOTE — TELEPHONE ENCOUNTER
Reason for Disposition   Caller can't be reached by phone    Protocols used: INFORMATION ONLY CALL - NO TRIAGE-ADULT-AH    Pt hung up- unable to complete triage- unable to leave message due to unidentified voicemail. Received call from [facility] with Red Flag Complaint. Attention Provider: Thank you for allowing me to participate in the care of your patient. The patient was connected to triage in response to information provided to the ECC. Please do not respond through this encounter as the response is not directed to a shared pool.

## 2021-10-20 ENCOUNTER — OFFICE VISIT (OUTPATIENT)
Dept: FAMILY MEDICINE CLINIC | Age: 36
End: 2021-10-20

## 2021-10-20 VITALS
SYSTOLIC BLOOD PRESSURE: 101 MMHG | HEART RATE: 85 BPM | BODY MASS INDEX: 25.76 KG/M2 | HEIGHT: 62 IN | WEIGHT: 140 LBS | DIASTOLIC BLOOD PRESSURE: 67 MMHG | RESPIRATION RATE: 18 BRPM | OXYGEN SATURATION: 100 % | TEMPERATURE: 97.6 F

## 2021-10-20 DIAGNOSIS — R30.0 DYSURIA: Primary | ICD-10-CM

## 2021-10-20 LAB
BILIRUB UR QL STRIP: NEGATIVE
GLUCOSE UR-MCNC: NEGATIVE MG/DL
KETONES P FAST UR STRIP-MCNC: NEGATIVE MG/DL
PH UR STRIP: 8.5 [PH] (ref 4.6–8)
PROT UR QL STRIP: NEGATIVE
SP GR UR STRIP: 1.02 (ref 1–1.03)
UA UROBILINOGEN AMB POC: ABNORMAL (ref 0.2–1)
URINALYSIS CLARITY POC: CLEAR
URINALYSIS COLOR POC: YELLOW
URINE BLOOD POC: NEGATIVE
URINE LEUKOCYTES POC: ABNORMAL
URINE NITRITES POC: NEGATIVE

## 2021-10-20 PROCEDURE — 81003 URINALYSIS AUTO W/O SCOPE: CPT | Performed by: FAMILY MEDICINE

## 2021-10-20 PROCEDURE — 99213 OFFICE O/P EST LOW 20 MIN: CPT | Performed by: FAMILY MEDICINE

## 2021-10-20 RX ORDER — CEPHALEXIN 500 MG/1
500 CAPSULE ORAL 2 TIMES DAILY
Qty: 14 CAPSULE | Refills: 0 | Status: SHIPPED | OUTPATIENT
Start: 2021-10-20 | End: 2021-10-21 | Stop reason: SINTOL

## 2021-10-20 NOTE — PROGRESS NOTES
1. Have you been to the ER, urgent care clinic since your last visit? Hospitalized since your last visit? No    2. Have you seen or consulted any other health care providers outside of the [address] [place] [address] since your last visit? Include any pap smears or colon screening. No    Reviewed record in preparation for visit and have necessary documentation  Goals that were addressed and/or need to be completed during or after this appointment include     Health Maintenance Due   Topic Date Due    Hepatitis C Screening  Never done    COVID-19 Vaccine (1) Never done    DTaP/Tdap/Td series (1 - Tdap) Never done    Flu Vaccine (1) Never done       Patient is accompanied by self I have received verbal consent from Jose Hannah to discuss any/all medical information while they are present in the room.

## 2021-10-21 ENCOUNTER — TELEPHONE (OUTPATIENT)
Dept: FAMILY MEDICINE CLINIC | Age: 36
End: 2021-10-21

## 2021-10-21 RX ORDER — AMOXICILLIN 500 MG/1
500 CAPSULE ORAL 2 TIMES DAILY
Qty: 14 CAPSULE | Refills: 0 | Status: SHIPPED | OUTPATIENT
Start: 2021-10-21 | End: 2021-10-28

## 2021-10-21 NOTE — TELEPHONE ENCOUNTER
Pt states that she is having bad side effects from ABX that was given. Headaches, upset stomachs, very tired.  Can she be treated with AMOXICILLIN

## 2021-10-25 ENCOUNTER — OFFICE VISIT (OUTPATIENT)
Dept: FAMILY MEDICINE CLINIC | Age: 36
End: 2021-10-25

## 2021-10-25 VITALS
DIASTOLIC BLOOD PRESSURE: 69 MMHG | HEIGHT: 62 IN | SYSTOLIC BLOOD PRESSURE: 108 MMHG | OXYGEN SATURATION: 99 % | WEIGHT: 141 LBS | HEART RATE: 85 BPM | RESPIRATION RATE: 14 BRPM | BODY MASS INDEX: 25.95 KG/M2 | TEMPERATURE: 98.8 F

## 2021-10-25 DIAGNOSIS — R10.2 SUPRAPUBIC PRESSURE: Primary | ICD-10-CM

## 2021-10-25 PROCEDURE — 99213 OFFICE O/P EST LOW 20 MIN: CPT | Performed by: FAMILY MEDICINE

## 2021-10-25 RX ORDER — PHENAZOPYRIDINE HYDROCHLORIDE 200 MG/1
200 TABLET, FILM COATED ORAL
Qty: 9 TABLET | Refills: 0 | Status: SHIPPED | OUTPATIENT
Start: 2021-10-25 | End: 2021-10-28

## 2021-10-25 NOTE — PROGRESS NOTES
Patient: Carli Aguirre MRN: [id number]  SSN: [id number]    YOB: 1985  Age: 31 y.o. Sex: female      Chief Complaint   Patient presents with   Nancy Ocampo is a 31 y.o. female who complains of dysuria, frequency for 3  days. Patient without flank pain, fever, chills, nausea or vomiting, abnormal vaginal discharge or bleeding. Patient does not have a history of recurrent UTI. Patient does not have a history of pyelonephritis. Medications:     Current Outpatient Medications   Medication Sig    amoxicillin (AMOXIL) 500 mg capsule Take 1 Capsule by mouth two (2) times a day for 7 days. No current facility-administered medications for this visit. Problem List:     Patient Active Problem List    Diagnosis Date Noted    Contusion of left great toe without damage to nail 2019       Medical History:   No past medical history on file. Allergies:   No Known Allergies    Surgical History:     Past Surgical History:   Procedure Laterality Date    DELIVERY   07    [place]  DELIVERY ONLY         Social History:     Social History     Socioeconomic History    Marital status:      Spouse name: Not on file    Number of children: Not on file    Years of education: Not on file    Highest education level: Not on file   Tobacco Use    Smoking status: Never Smoker    Smokeless tobacco: Never Used   Substance and Sexual Activity    Alcohol use: No    Drug use: No    Sexual activity: Yes     Partners: Male     Birth control/protection: None     Social Determinants of Health     Financial Resource Strain:     Difficulty of Paying Living Expenses:    Food Insecurity:     Worried About Running Out of Food in the Last Year:     Ran Out of Food in the Last Year:    Transportation Needs:     Lack of Transportation (Medical):      Lack of Transportation (Non-Medical):    Physical Activity:     Days of Exercise per Week:     Minutes of Exercise per Session:    Stress:     Feeling of Stress :    Social Connections:     Frequency of Communication with Friends and Family:     Frequency of Social Gatherings with Friends and Family:     Attends [employer] Services:     Active Member of Clubs or Organizations:     Attends Club or Organization Meetings:     Marital Status:        Review of Symptoms:  Constitutional: No fever or chills  Cardiovascular: Negative for chest pain or palpitations  Respiratory: Negative for cough, wheezing or SOB  Gastrointestinal: Negative for nausea or abdominal pain  Genital/urinary: see HPI  Neurological: Negative for weakness or paresthesia     Vitals:    10/20/21 0935   BP: 101/67   Pulse: 85   Resp: 18   Temp: 97.6 °F (36.4 °C)   TempSrc: Oral   SpO2: 100%   Weight: 140 lb (63.5 kg)   Height: 5' 2\" (1.575 m)        Physical Examination:  General: Appears well, in no apparent distress. Eyes: Sclera clear  Cardiovascular: Regular rate and rhythm  Respiratory: Symmetrical, unlabored effort  Abdomen: Soft without tenderness. Extremities: Full range of motion  Neuro: Active, alert, and oriented          ASSESSMENT:   Diagnoses and all orders for this visit:    1. Dysuria  -     AMB POC URINALYSIS DIP STICK AUTO W/O MICRO      Plan of care:  Diagnoses were discussed in detail with patient. Medications reviewed and appropriate. Patient to continue current prescribed medications as written. Medication risks/benefits/side effects discussed with patient. All of the patient's questions were addressed and answered to apparent satisfaction. The patient understands and agrees with our plan of care. The patient knows to call back if they have questions about the plan of care or if symptoms change. The patient received an After-Visit Summary which contains VS, diagnoses, orders, allergy and medication lists. No future appointments.

## 2021-10-25 NOTE — PROGRESS NOTES
1. Have you been to the ER, urgent care clinic since your last visit? Hospitalized since your last visit? No    2. Have you seen or consulted any other health care providers outside of the [address] [place] [address] since your last visit? Include any pap smears or colon screening. No    Reviewed record in preparation for visit and have necessary documentation  Pt did not bring medication to office visit for review  Patient is accompanied by self I have received verbal consent from [facility] to discuss any/all medical information while they are present in the room.     Goals that were addressed and/or need to be completed during or after this appointment include     Health Maintenance Due   Topic Date Due    Hepatitis C Screening  Never done    COVID-19 Vaccine (1) Never done    DTaP/Tdap/Td series (1 - Tdap) Never done    Flu Vaccine (1) Never done

## 2021-10-28 NOTE — PROGRESS NOTES
Patient: Avelino Parson MRN: [id number]  SSN: [id number]    YOB: 1985  Age: 31 y.o. Sex: female      Chief Complaint   Patient presents with    Abdominal Pain     lower abdominal pain, bladder infection/UTI last week      Avelino Parsno is a 31 y.o. female who complains of suprapubic pressure worse when sitting. Patient without flank pain, fever, chills, nausea or vomiting, abnormal vaginal discharge or bleeding. Medications:     Current Outpatient Medications   Medication Sig    phenazopyridine (PYRIDIUM) 200 mg tablet Take 1 Tablet by mouth three (3) times daily as needed for Pain for up to 3 days.  amoxicillin (AMOXIL) 500 mg capsule Take 1 Capsule by mouth two (2) times a day for 7 days. No current facility-administered medications for this visit. Problem List:     Patient Active Problem List    Diagnosis Date Noted    Contusion of left great toe without damage to nail 2019       Medical History:   History reviewed. No pertinent past medical history.     Allergies:   No Known Allergies    Surgical History:     Past Surgical History:   Procedure Laterality Date    DELIVERY   07    [place]  DELIVERY ONLY         Social History:     Social History     Socioeconomic History    Marital status:      Spouse name: Not on file    Number of children: Not on file    Years of education: Not on file    Highest education level: Not on file   Tobacco Use    Smoking status: Never Smoker    Smokeless tobacco: Never Used   Substance and Sexual Activity    Alcohol use: No    Drug use: No    Sexual activity: Yes     Partners: Male     Birth control/protection: None     Social Determinants of Health     Financial Resource Strain:     Difficulty of Paying Living Expenses:    Food Insecurity:     Worried About Running Out of Food in the Last Year:     [address] in the Last Year:    Transportation Needs:     Lack of Transportation (Medical):    [facility] Lack of Transportation (Non-Medical):    Physical Activity:     Days of Exercise per Week:     Minutes of Exercise per Session:    Stress:     Feeling of Stress :    Social Connections:     Frequency of Communication with Friends and Family:     Frequency of Social Gatherings with Friends and Family:     Attends Mormonism Services:     Active Member of Clubs or Organizations:     Attends Club or Organization Meetings:     Marital Status:        Review of Symptoms:  Constitutional: No fever or chills  Cardiovascular: Negative for chest pain or palpitations  Respiratory: Negative for cough, wheezing or SOB  Gastrointestinal: Negative for nausea or abdominal pain  Genital/urinary: see HPI  Neurological: Negative for weakness or paresthesia     Vitals:    10/25/21 1617   BP: 108/69   Pulse: 85   Resp: 14   Temp: 98.8 °F (37.1 °C)   TempSrc: Oral   SpO2: 99%   Weight: 141 lb (64 kg)   Height: 5' 2\" (1.575 m)        Physical Examination:  General: Appears well, in no apparent distress. Eyes: Sclera clear  Cardiovascular: Regular rate and rhythm  Respiratory: Symmetrical, unlabored effort  Abdomen: Soft without tenderness. Extremities: Full range of motion  Neuro: Active, alert, and oriented          ASSESSMENT:   Diagnoses and all orders for this visit:    1. Lower abdominal pain  -     NUSWAB VAGINITIS PLUS; Future    Other orders  -     phenazopyridine (PYRIDIUM) 200 mg tablet; Take 1 Tablet by mouth three (3) times daily as needed for Pain for up to 3 days. Plan of care:  Diagnoses were discussed in detail with patient. Medications reviewed and appropriate. Patient to continue current prescribed medications as written. Medication risks/benefits/side effects discussed with patient. All of the patient's questions were addressed and answered to apparent satisfaction. The patient understands and agrees with our plan of care.   The patient knows to call back if they have questions about the plan of care or if symptoms change. The patient received an After-Visit Summary which contains VS, diagnoses, orders, allergy and medication lists. No future appointments.

## 2021-11-04 LAB
A VAGINAE DNA VAG QL NAA+PROBE: NORMAL SCORE
BVAB2 DNA VAG QL NAA+PROBE: NORMAL SCORE
C ALBICANS DNA VAG QL NAA+PROBE: NEGATIVE
C GLABRATA DNA VAG QL NAA+PROBE: NEGATIVE
C TRACH RRNA SPEC QL NAA+PROBE: NEGATIVE
MEGA1 DNA VAG QL NAA+PROBE: NORMAL SCORE
N GONORRHOEA RRNA SPEC QL NAA+PROBE: NEGATIVE
SPECIMEN SOURCE: NORMAL
T VAGINALIS RRNA SPEC QL NAA+PROBE: NORMAL

## 2021-11-12 ENCOUNTER — OFFICE VISIT (OUTPATIENT)
Dept: FAMILY MEDICINE CLINIC | Age: 36
End: 2021-11-12

## 2021-11-12 VITALS
BODY MASS INDEX: 25.8 KG/M2 | HEART RATE: 100 BPM | WEIGHT: 140.2 LBS | HEIGHT: 62 IN | RESPIRATION RATE: 14 BRPM | OXYGEN SATURATION: 99 % | TEMPERATURE: 98.5 F | DIASTOLIC BLOOD PRESSURE: 67 MMHG | SYSTOLIC BLOOD PRESSURE: 106 MMHG

## 2021-11-12 DIAGNOSIS — R35.0 URINARY FREQUENCY: Primary | ICD-10-CM

## 2021-11-12 LAB
BILIRUB UR QL STRIP: NEGATIVE
GLUCOSE UR-MCNC: NEGATIVE MG/DL
KETONES P FAST UR STRIP-MCNC: NEGATIVE MG/DL
PH UR STRIP: 6.5 [PH] (ref 4.6–8)
PROT UR QL STRIP: NEGATIVE
SP GR UR STRIP: 1.03 (ref 1–1.03)
UA UROBILINOGEN AMB POC: NORMAL (ref 0.2–1)
URINALYSIS CLARITY POC: CLEAR
URINALYSIS COLOR POC: YELLOW
URINE BLOOD POC: NEGATIVE
URINE LEUKOCYTES POC: NORMAL
URINE NITRITES POC: NEGATIVE

## 2021-11-12 PROCEDURE — 81003 URINALYSIS AUTO W/O SCOPE: CPT | Performed by: STUDENT IN AN ORGANIZED HEALTH CARE EDUCATION/TRAINING PROGRAM

## 2021-11-12 PROCEDURE — 99214 OFFICE O/P EST MOD 30 MIN: CPT | Performed by: STUDENT IN AN ORGANIZED HEALTH CARE EDUCATION/TRAINING PROGRAM

## 2021-11-12 NOTE — PROGRESS NOTES
[address] [facility] [address] [facility], [facility] [address]  P ([phone])  Date of visit:  11/14/2021    Sreedhar Euceda is a 31 y.o. female who presents to the clinic today due to urinary frequency. According to patient, she has been having problem with urinary frequency for a couple of months now. Denies hematuria, vaginal discharge. Endorses intermittent suprapubic pressure like sensation. More prominent when she sits down. She recently finished a course of antibiotic for UTI. Patient was seen on 10/25/2021 for similar concerns. Patient was advised to take Pyridium 200 mg, 1 tab TID. Per patient, she has not been taking Pyridium because she believes it is associated with causing cancer. Review of Systems   Per HPI    Allergies   No Known Allergies    Medications  No current outpatient medications on file. No current facility-administered medications for this visit. Medical History  History reviewed. No pertinent past medical history. Immunizations   Immunization History   Administered Date(s) Administered    MMR 07/25/2015       Social History  Social History     Tobacco Use    Smoking status: Never Smoker    Smokeless tobacco: Never Used   Substance Use Topics    Alcohol use: No    Drug use: No       Objective     Visit Vitals  /67 (BP 1 Location: Right upper arm, BP Patient Position: Sitting, BP Cuff Size: Adult)   Pulse 100   Temp 98.5 °F (36.9 °C) (Oral)   Resp 14   Ht 5' 2\" (1.575 m)   Wt 140 lb 3.2 oz (63.6 kg)   LMP 11/08/2021 (Exact Date)   SpO2 99%   BMI 25.64 kg/m²       Physical Examination  Physical Exam  Constitutional:       General: She is not in acute distress. Appearance: Normal appearance. She is not ill-appearing. HENT:      Head: Normocephalic and atraumatic. Eyes:      General: No scleral icterus. Right eye: No discharge. Left eye: No discharge. Cardiovascular:      Rate and Rhythm: Normal rate.       Heart sounds: Normal heart sounds. Pulmonary:      Effort: Pulmonary effort is normal. No respiratory distress. Abdominal:      General: Bowel sounds are normal. There is no distension. Palpations: Abdomen is soft. Tenderness: There is no right CVA tenderness or left CVA tenderness. Comments: Tenderness in the suprapubic region   Musculoskeletal:         General: Normal range of motion. Cervical back: Normal range of motion. Skin:     General: Skin is warm. Neurological:      General: No focal deficit present. Mental Status: She is alert. Psychiatric:         Mood and Affect: Mood normal.         Behavior: Behavior normal.         Assessment   Marshall Quiroz is a 31 y. o. who presents to the clinic due to urinary frequency. Plan     1. Urinary frequency: Patient endorses urinary frequency for months. Less likely infectious given duration, UA is negative for nitrites. Patient denies dysuria. Symptom may likely be 2/2 Urge incontinence vs bladder spasm. Discussed with patient in regards to doing a trial of antimuscarinic agent for urge incontinence for relieve of symptoms. Patient does not like taking medications. Patient is also open to obtaining imaging of the abdomen and pelvic area to determine reason for symptoms. Patient understands that imaging may not be diagnostic. Patient concluded on seeing Urology.     Results for orders placed or performed in visit on 11/12/21   AMB POC URINALYSIS DIP STICK AUTO W/O MICRO     Status: None   Result Value Ref Range Status    Color (UA POC) Yellow  Final    Clarity (UA POC) Clear  Final    Glucose (UA POC) Negative Negative Final    Bilirubin (UA POC) Negative Negative Final    Ketones (UA POC) Negative Negative Final    Specific gravity (UA POC) 1.030 1.001 - 1.035 Final    Blood (UA POC) Negative Negative Final    pH (UA POC) 6.5 4.6 - 8.0 Final    Protein (UA POC) Negative Negative Final    Urobilinogen (UA POC) 0.2 mg/dL 0.2 - 1 Final Nitrites (UA POC) Negative Negative Final    Leukocyte esterase (UA POC) Trace Negative Final     - REFERRAL TO UROLOGY  - AMB POC URINALYSIS DIP STICK AUTO W/O MICRO      I have discussed the aforementioned diagnoses and plan with the patient in detail. I have provided information in person and/or in AVS. All questions answered prior to discharge.     I discussed this patient with Dr. rBadly Correa (Attending Physician)   Signed By:  Taj Hernandez MD    Family Medicine Resident

## 2021-11-12 NOTE — PROGRESS NOTES
1. Have you been to the ER, urgent care clinic since your last visit? Hospitalized since your last visit? No    2. Have you seen or consulted any other health care providers outside of the [address] [place] [address] since your last visit? Include any pap smears or colon screening. No    Reviewed record in preparation for visit and have necessary documentation  Pt did not bring medication to office visit for review  Patient is accompanied by self I have received verbal consent from Ciara Garcia to discuss any/all medical information while they are present in the room.     Goals that were addressed and/or need to be completed during or after this appointment include     Health Maintenance Due   Topic Date Due    Hepatitis C Screening  Never done    COVID-19 Vaccine (1) Never done    DTaP/Tdap/Td series (1 - Tdap) Never done    Flu Vaccine (1) Never done

## 2022-01-04 ENCOUNTER — TELEPHONE (OUTPATIENT)
Dept: FAMILY MEDICINE CLINIC | Age: 37
End: 2022-01-04

## 2022-01-04 NOTE — TELEPHONE ENCOUNTER
Call made to pt to schedule an appt per Dr John Villarreal. No answer, mess left. Pt made aware in message that Dr. John Villarreal is not in the office this month so to schedule with another provider.  Please schedule with another resident for a PAP

## 2022-01-11 PROBLEM — R35.0 URINARY FREQUENCY: Status: ACTIVE | Noted: 2022-01-11

## 2022-01-11 NOTE — PROGRESS NOTES
HISTORY OF PRESENT ILLNESS  Bethany Haynes is a 31 y.o. female has significant medical history. She reports frequency, has to go almost every 2 hors. Kevin frequent UTI's. Has been treated with abx recently 3 months ago. She denies any burning, blood in the urine,fever,chills,abdominal pain. Reports feeling lower abdominal pressure if holds urine for too long. . She is sexually active and her frequency does not change after sexual intercourse. .Reports stress incontinence with coughing and laughing. Reports having vaginal prolapse able to feel it  with finger             HPI  Chronic Conditions Addressed Today     1. Urinary frequency     Overview       Patient has been having problem with urinary frequency for a couple of months now. As per PCP notes Thr patient \"endorses intermittent suprapubic pressure like sensation. More prominent when she sits down. She recently finished a course of antibiotic for UTI. Patient was seen on 10/25/2021 for similar concerns. Patient was advised to take Pyridium 200 mg, 1 tab TID\"              Relevant Medications     vibegron (Gemtesa) 75 mg tab     Other Relevant Orders     AMB POC URINALYSIS DIP STICK AUTO W/O MICRO (Completed)     AMB POC PVR, MEIR,POST-VOID RES,US,NON-IMAGING (Completed)     AMB POC PVR, MEIR,POST-VOID RES,US,NON-IMAGING (Completed)    2. Dysuria    3. Pelvic pain - Primary    4. Stress incontinence        Patient denies the symptoms of COVID-19 per routine screening guidelines. Review of Systems   Constitutional: Negative. HENT:        Cough   Gastrointestinal: Positive for abdominal pain and heartburn. Genitourinary: Positive for frequency. Skin: Negative. Neurological: Positive for headaches. Endo/Heme/Allergies: Negative. Psychiatric/Behavioral: Negative. Past Medical History:  PMHx (including negatives):  has a past medical history of Burning with urination.     She has no past medical history of Abnormal Pap smear, Acquired hypothyroidism, Anemia NEC, Asthma, Complication of anesthesia, Diabetes mellitus, Essential hypertension, Genital herpes, unspecified, Gonorrhea, Heart abnormalities, Herpes gestationis, Herpes simplex without mention of complication, Human immunodeficiency virus (HIV) disease ([facility] [address] OTHER MEDICAL, Infertility, Infertility, female, Kidney disease, Liver disease, Phlebitis and thrombophlebitis of unspecified site, Pituitary disorder ([facility] [address] Polycystic disease, ovaries, Postpartum depression, Psychiatric problem, Rhesus isoimmunization unspecified as to episode of care in pregnancy, Sickle-cell disease, unspecified, Syphilis, Systemic lupus erythematosus ([facility] [address] Trauma, Unspecified breast disorder, Unspecified diseases of blood and blood-forming organs, or Unspecified epilepsy without mention of intractable epilepsy. PSurgHx:  has a past surgical history that includes delivery  (07) and pr  delivery only. PSocHx:  reports that she has never smoked. She has never used smokeless tobacco. She reports that she does not drink alcohol and does not use drugs. Home Medications    Medication Sig Start Date End Date Taking? Authorizing Provider   neida Correia) 75 mg tab Take 75 mg by mouth daily. 22  Yes Pilar ArtMD jimi      Physical Exam  Vitals and nursing note reviewed. Constitutional:       Appearance: Normal appearance. HENT:      Head: Normocephalic. Nose: Nose normal.      Mouth/Throat:      Mouth: Mucous membranes are moist.   Eyes:      Pupils: Pupils are equal, round, and reactive to light. Cardiovascular:      Rate and Rhythm: Normal rate and regular rhythm. Pulmonary:      Effort: Pulmonary effort is normal.   Abdominal:      General: Bowel sounds are normal.   Genitourinary:     Comments: Deferred  Musculoskeletal:         General: Normal range of motion. Skin:     General: Skin is warm. Neurological:      General: No focal deficit present.       Mental Status: She is alert and oriented to person, place, and time. Psychiatric:         Mood and Affect: Mood normal.         Behavior: Behavior normal.         Thought Content: Thought content normal.         Judgment: Judgment normal.         ASSESSMENT and PLAN  Diagnoses and all orders for this visit:    1. Pelvic pain    2. Urinary frequency  -     AMB POC URINALYSIS DIP STICK AUTO W/O MICRO  -     AMB POC PVR, MEIR,POST-VOID RES,US,NON-IMAGING  -     vibegron (Gemtesa) 75 mg tab; Take 75 mg by mouth daily.  -     AMB POC PVR, EMIR,POST-VOID RES,US,NON-IMAGING    3. Dysuria    4.  Stress incontinence         Puff questionnaire is a 7, MD 20 cc-use Gemtessa, me back in a few weeks  Will need cystoscopy office-is aware of the risk of bleeding infection injury to the urethra she has no questions

## 2022-01-12 ENCOUNTER — OFFICE VISIT (OUTPATIENT)
Dept: UROLOGY | Age: 37
End: 2022-01-12

## 2022-01-12 VITALS
TEMPERATURE: 97.6 F | HEIGHT: 62 IN | RESPIRATION RATE: 16 BRPM | SYSTOLIC BLOOD PRESSURE: 104 MMHG | WEIGHT: 140 LBS | OXYGEN SATURATION: 100 % | BODY MASS INDEX: 25.76 KG/M2 | DIASTOLIC BLOOD PRESSURE: 65 MMHG | HEART RATE: 89 BPM

## 2022-01-12 DIAGNOSIS — R35.0 URINARY FREQUENCY: ICD-10-CM

## 2022-01-12 DIAGNOSIS — N39.3 STRESS INCONTINENCE: ICD-10-CM

## 2022-01-12 DIAGNOSIS — R30.0 DYSURIA: ICD-10-CM

## 2022-01-12 DIAGNOSIS — R10.2 PELVIC PAIN: Primary | ICD-10-CM

## 2022-01-12 LAB
BILIRUB UR QL STRIP: NEGATIVE
GLUCOSE UR-MCNC: NEGATIVE MG/DL
KETONES P FAST UR STRIP-MCNC: NEGATIVE MG/DL
PH UR STRIP: 7 [PH] (ref 4.6–8)
PROT UR QL STRIP: NEGATIVE
PVR POC: NORMAL CC
PVR POC: NORMAL CC
SP GR UR STRIP: 1.02 (ref 1–1.03)
UA UROBILINOGEN AMB POC: NORMAL (ref 0.2–1)
URINALYSIS CLARITY POC: CLEAR
URINALYSIS COLOR POC: YELLOW
URINE BLOOD POC: NEGATIVE
URINE LEUKOCYTES POC: NORMAL
URINE NITRITES POC: NEGATIVE

## 2022-01-12 PROCEDURE — 81003 URINALYSIS AUTO W/O SCOPE: CPT | Performed by: UROLOGY

## 2022-01-12 PROCEDURE — 99204 OFFICE O/P NEW MOD 45 MIN: CPT | Performed by: UROLOGY

## 2022-01-12 PROCEDURE — 51798 US URINE CAPACITY MEASURE: CPT | Performed by: UROLOGY

## 2022-01-12 RX ORDER — VIBEGRON 75 MG/1
75 TABLET, FILM COATED ORAL DAILY
Qty: 90 EACH | Refills: 3 | Status: SHIPPED | OUTPATIENT
Start: 2022-01-12 | End: 2022-07-12 | Stop reason: ALTCHOICE

## 2022-01-12 NOTE — PROGRESS NOTES
Chief Complaint   Patient presents with    New Patient     ROS, PUF Forms - Ref Vannesa Hill 11/12/21 notes in chart    Urinary Frequency         1. Have you been to the ER, urgent care clinic since your last visit? Hospitalized since your last visit? No    2. Have you seen or consulted any other health care providers outside of the [address] [place] [address] since your last visit? Include any pap smears or colon screening.  No      Visit Vitals  /65 (BP 1 Location: Left upper arm, BP Patient Position: Sitting, BP Cuff Size: Adult)   Pulse 89   Temp 97.6 °F (36.4 °C) (Temporal)   Resp 16   Ht 5' 2\" (1.575 m)   Wt 140 lb (63.5 kg)   LMP  (LMP Unknown)   SpO2 100%   BMI 25.61 kg/m²

## 2022-01-24 ENCOUNTER — TELEPHONE (OUTPATIENT)
Dept: UROLOGY | Age: 37
End: 2022-01-24

## 2022-01-31 PROBLEM — N39.3 STRESS INCONTINENCE: Status: ACTIVE | Noted: 2022-01-31

## 2022-01-31 PROBLEM — R10.2 PELVIC PAIN: Status: ACTIVE | Noted: 2022-01-31

## 2022-01-31 PROBLEM — R30.0 DYSURIA: Status: ACTIVE | Noted: 2022-01-31

## 2022-02-03 ENCOUNTER — OFFICE VISIT (OUTPATIENT)
Dept: FAMILY MEDICINE CLINIC | Age: 37
End: 2022-02-03

## 2022-02-03 VITALS
DIASTOLIC BLOOD PRESSURE: 72 MMHG | HEIGHT: 62 IN | RESPIRATION RATE: 16 BRPM | TEMPERATURE: 98.3 F | HEART RATE: 86 BPM | OXYGEN SATURATION: 100 % | SYSTOLIC BLOOD PRESSURE: 109 MMHG | WEIGHT: 149 LBS | BODY MASS INDEX: 27.42 KG/M2

## 2022-02-03 DIAGNOSIS — R30.0 DYSURIA: ICD-10-CM

## 2022-02-03 DIAGNOSIS — N89.8 VAGINAL DISCHARGE: ICD-10-CM

## 2022-02-03 DIAGNOSIS — Z01.89 ROUTINE LAB DRAW: Primary | ICD-10-CM

## 2022-02-03 PROCEDURE — 99213 OFFICE O/P EST LOW 20 MIN: CPT | Performed by: STUDENT IN AN ORGANIZED HEALTH CARE EDUCATION/TRAINING PROGRAM

## 2022-02-03 NOTE — PROGRESS NOTES
1. Have you been to the ER, urgent care clinic since your last visit? Hospitalized since your last visit? No    2. Have you seen or consulted any other health care providers outside of the [address] [place] [address] since your last visit? Include any pap smears or colon screening.  No  Reviewed record in preparation for visit and have necessary documentation  Pt did not bring medication to office visit for review    Goals that were addressed and/or need to be completed during or after this appointment include   Health Maintenance Due   Topic Date Due    Hepatitis C Screening  Never done    COVID-19 Vaccine (1) Never done    DTaP/Tdap/Td series (1 - Tdap) Never done    Flu Vaccine (1) Never done

## 2022-02-03 NOTE — PROGRESS NOTES
[facility] [address] [facility], [facility] 24  P ([phone])  Date of visit:  2/9/2022    Sreedhar Lima is a  31 y.o. female who presents to the clinic due to pressure in her suprapubic area and urinary frequency. Patient has been seen for similar issue in the past. Patient was referred to Urology at last visit. She has since followed up with urology. She was given a sample of a medication, but patient has not started or tried the medication. Cystoscopy was recommended by Urology, patient has not followed. LMP was 10 days ago. Last 4-5 days. Normal bleeding. No cramps. No bleeding in between menstrual period. Review of Systems   General/Constitutional:   No headache, fever, fatigue. Eyes:   No redness, pruritis, pain, visual changes, swelling, or discharge      Ears:    No pain, loss or changes in hearing     Neck:   No swelling, masses, stiffness, pain, or limited movement     Cardiac:    No chest pain      Respiratory:   No cough or shortness of breath     GI:   No nausea/vomiting, diarrhea      :   No dysuria or  hematuria      Allergies   No Known Allergies    Medications  Current Outpatient Medications   Medication Sig    vibegron (Gemtesa) 75 mg tab Take 75 mg by mouth daily. (Patient not taking: Reported on 2/3/2022)     No current facility-administered medications for this visit.        Medical History  Past Medical History:   Diagnosis Date    Burning with urination        Immunizations   Immunization History   Administered Date(s) Administered    MMR 07/25/2015       Social History  Social History     Tobacco Use    Smoking status: Never Smoker    Smokeless tobacco: Never Used   Vaping Use    Vaping Use: Never used   Substance Use Topics    Alcohol use: No    Drug use: No       Objective     Visit Vitals  /72   Pulse 86   Temp 98.3 °F (36.8 °C)   Resp 16   Ht 5' 2\" (1.575 m)   Wt 149 lb (67.6 kg)   LMP  (LMP Unknown)   SpO2 100%   BMI 27.25 kg/m² Physical Exam  Constitutional:       Appearance: Normal appearance. HENT:      Head: Normocephalic and atraumatic. Eyes:      General: No scleral icterus. Right eye: No discharge. Left eye: No discharge. Cardiovascular:      Rate and Rhythm: Normal rate and regular rhythm. Pulses: Normal pulses. Pulmonary:      Effort: Pulmonary effort is normal. No respiratory distress. Abdominal:      General: There is no distension. Palpations: Abdomen is soft. Tenderness: There is no abdominal tenderness. Genitourinary:     General: Normal vulva. Comments: Small white liquid vaginal discharge  Musculoskeletal:         General: Normal range of motion. Neurological:      General: No focal deficit present. Mental Status: She is alert. Assessment   Renuka Campa is a 31 y. o. who presents to the clinic for urinary frequency. Patient was seen by urology for similar issue. Cystoscopy was recommended, but patient has not been able to follow up. Per Urology last note, patient was advised to use Gemtessa (treatment for overactive bladder). Patient has not started the medication. Plan     -Please follow up with urology for cystoscopy. -Trial of medication initiated by Urology  -[facility] for vaginal discharge ddx BV, vs, trich vs yeast infection. No concern for STI or STD's      Follow up: prn    I have discussed the aforementioned diagnoses and plan with the patient in detail. I have provided information in person and/or in AVS. All questions answered prior to discharge.     I discussed this patient with   (Attending Physician)   Signed By:  Oni Oliveros MD    Family Medicine Resident

## 2022-02-04 ENCOUNTER — TELEPHONE (OUTPATIENT)
Dept: FAMILY MEDICINE CLINIC | Age: 37
End: 2022-02-04

## 2022-02-04 NOTE — TELEPHONE ENCOUNTER
Returned call to patient. Advised her that the message has been sent to Dr. Brandee Carranza and waiting response. She verbalized understanding.

## 2022-02-06 LAB
A VAGINAE DNA VAG QL NAA+PROBE: NORMAL SCORE
ALBUMIN SERPL-MCNC: 4.3 G/DL (ref 3.8–4.8)
ALBUMIN/GLOB SERPL: 1.8 {RATIO} (ref 1.2–2.2)
ALP SERPL-CCNC: 62 IU/L (ref 44–121)
ALT SERPL-CCNC: 33 IU/L (ref 0–32)
AST SERPL-CCNC: 21 IU/L (ref 0–40)
BILIRUB SERPL-MCNC: 0.3 MG/DL (ref 0–1.2)
BUN SERPL-MCNC: 12 MG/DL (ref 6–20)
BUN/CREAT SERPL: 14 (ref 9–23)
BVAB2 DNA VAG QL NAA+PROBE: NORMAL SCORE
C ALBICANS DNA VAG QL NAA+PROBE: NEGATIVE
C GLABRATA DNA VAG QL NAA+PROBE: NEGATIVE
CALCIUM SERPL-MCNC: 8.9 MG/DL (ref 8.7–10.2)
CHLORIDE SERPL-SCNC: 105 MMOL/L (ref 96–106)
CO2 SERPL-SCNC: 26 MMOL/L (ref 20–29)
CREAT SERPL-MCNC: 0.85 MG/DL (ref 0.57–1)
GLOBULIN SER CALC-MCNC: 2.4 G/DL (ref 1.5–4.5)
GLUCOSE SERPL-MCNC: 80 MG/DL (ref 65–99)
MEGA1 DNA VAG QL NAA+PROBE: NORMAL SCORE
POTASSIUM SERPL-SCNC: 3.9 MMOL/L (ref 3.5–5.2)
PROT SERPL-MCNC: 6.7 G/DL (ref 6–8.5)
SODIUM SERPL-SCNC: 142 MMOL/L (ref 134–144)
T VAGINALIS DNA VAG QL NAA+PROBE: NEGATIVE

## 2022-02-08 ENCOUNTER — TELEPHONE (OUTPATIENT)
Dept: FAMILY MEDICINE CLINIC | Age: 37
End: 2022-02-08

## 2022-02-08 NOTE — TELEPHONE ENCOUNTER
Pt called back asking that she please get a call back. She did the Home UTI Test. Wanted to come in and do a urine sample.

## 2022-02-09 DIAGNOSIS — R30.0 DYSURIA: Primary | ICD-10-CM

## 2022-02-11 LAB
BACTERIA SPEC CULT: NORMAL
CC UR VC: NORMAL
SERVICE CMNT-IMP: NORMAL

## 2022-02-24 ENCOUNTER — OFFICE VISIT (OUTPATIENT)
Dept: FAMILY MEDICINE CLINIC | Age: 37
End: 2022-02-24

## 2022-02-24 VITALS
BODY MASS INDEX: 27.02 KG/M2 | SYSTOLIC BLOOD PRESSURE: 107 MMHG | HEIGHT: 62 IN | DIASTOLIC BLOOD PRESSURE: 59 MMHG | HEART RATE: 95 BPM | RESPIRATION RATE: 20 BRPM | TEMPERATURE: 98.2 F | OXYGEN SATURATION: 99 % | WEIGHT: 146.8 LBS

## 2022-02-24 DIAGNOSIS — R10.2 SUPRAPUBIC PRESSURE: Primary | ICD-10-CM

## 2022-02-24 LAB
ALBUMIN UR QL STRIP: 10 MG/L
BILIRUB UR QL STRIP: NEGATIVE
CREATININE, URINE POC: 300 MG/DL
GLUCOSE UR-MCNC: NEGATIVE MG/DL
HCG URINE, QL. (POC): NEGATIVE
KETONES P FAST UR STRIP-MCNC: NEGATIVE MG/DL
MICROALBUMIN/CREAT RATIO POC: <30 MG/G
PH UR STRIP: 7 [PH] (ref 4.6–8)
PROT UR QL STRIP: NEGATIVE
SP GR UR STRIP: 1.02 (ref 1–1.03)
UA UROBILINOGEN AMB POC: NORMAL (ref 0.2–1)
URINALYSIS CLARITY POC: CLEAR
URINALYSIS COLOR POC: YELLOW
URINE BLOOD POC: NEGATIVE
URINE LEUKOCYTES POC: NEGATIVE
URINE NITRITES POC: NEGATIVE
VALID INTERNAL CONTROL?: YES

## 2022-02-24 PROCEDURE — 81025 URINE PREGNANCY TEST: CPT | Performed by: FAMILY MEDICINE

## 2022-02-24 PROCEDURE — 81001 URINALYSIS AUTO W/SCOPE: CPT | Performed by: FAMILY MEDICINE

## 2022-02-24 PROCEDURE — 82044 UR ALBUMIN SEMIQUANTITATIVE: CPT | Performed by: FAMILY MEDICINE

## 2022-02-24 PROCEDURE — 99213 OFFICE O/P EST LOW 20 MIN: CPT | Performed by: FAMILY MEDICINE

## 2022-02-24 NOTE — PROGRESS NOTES
1. Have you been to the ER, urgent care clinic since your last visit? Hospitalized since your last visit? No    2. Have you seen or consulted any other health care providers outside of the [address] [place] [address] since your last visit? Include any pap smears or colon screening. No    Reviewed record in preparation for visit and have necessary documentation  Pt did not bring medication to office visit for review  Patient is accompanied by self I have received verbal consent from Gordon Hooper to discuss any/all medical information while they are present in the room.     Goals that were addressed and/or need to be completed during or after this appointment include     Health Maintenance Due   Topic Date Due    Hepatitis C Screening  Never done    COVID-19 Vaccine (1) Never done    DTaP/Tdap/Td series (1 - Tdap) Never done    Flu Vaccine (1) Never done

## 2022-02-27 PROBLEM — R39.9 LOWER URINARY TRACT SYMPTOMS: Status: ACTIVE | Noted: 2022-02-27

## 2022-02-27 NOTE — PROGRESS NOTES
HISTORY OF PRESENT ILLNESS  Marisela Hudson is a 31 y.o. female is here for cystoscopy. She is on gemtessa from last appointment. She reports not taking gemtessa due to [place]. History  She reports frequency, has to go almost every 2 hours. Kevin frequent UTI's. Has been treated with abx recently 3 months ago. She denies any burning, blood in the urine,fever,chills,abdominal pain. Reports feeling lower abdominal pressure if holds urine for too long. . She is sexually active and her frequency does not change after sexual intercourse. .Reports stress incontinence with coughing and laughing. Reports having vaginal prolapse able to feel it  with finger            HPI  Chronic Conditions Addressed Today     1. Urinary frequency - Primary     Overview       Patient has been having problem with urinary frequency for a couple of months now. As per PCP notes Thr patient \"endorses intermittent suprapubic pressure like sensation. More prominent when she sits down. She recently finished a course of antibiotic for UTI. Patient was seen on 10/25/2021 for similar concerns. Patient was advised to take Pyridium 200 mg, 1 tab TID\"             2. Stress incontinence    3. Lower urinary tract symptoms     Overview      She reports frequency, has to go almost every 2 hors. Kevin frequent UTI's. Has been treated with abx recently 3 months ago. She denies any burning, blood in the urine,fever,chills,abdominal pain. Reports feeling lower abdominal pressure if holds urine for too long. . She is sexually active and her frequency does not change after sexual intercourse. .Reports stress incontinence with coughing and laughing. Reports having vaginal prolapse able to feel it  with finger             Patient denies the symptoms of COVID-19 per routine screening guidelines. Review of Systems   All other systems reviewed and are negative.       Past Medical History:  PMHx (including negatives):  has a past medical history of Burning with urination. She has no past medical history of Abnormal Pap smear, Acquired hypothyroidism, Anemia NEC, Asthma, Complication of anesthesia, Diabetes mellitus, Essential hypertension, Genital herpes, unspecified, Gonorrhea, Heart abnormalities, Herpes gestationis, Herpes simplex without mention of complication, Human immunodeficiency virus (HIV) disease ([facility] [address] OTHER MEDICAL, Infertility, Infertility, female, Kidney disease, Liver disease, Phlebitis and thrombophlebitis of unspecified site, Pituitary disorder ([place] [address] Polycystic disease, ovaries, Postpartum depression, Psychiatric problem, Rhesus isoimmunization unspecified as to episode of care in pregnancy, Sickle-cell disease, unspecified, Syphilis, Systemic lupus erythematosus ([facility] [address] Trauma, Unspecified breast disorder, Unspecified diseases of blood and blood-forming organs, or Unspecified epilepsy without mention of intractable epilepsy. PSurgHx:  has a past surgical history that includes delivery  (07) and pr  delivery only. PSocHx:  reports that she has never smoked. She has never used smokeless tobacco. She reports that she does not drink alcohol and does not use drugs. Home Medications    Medication Sig Start Date End Date Taking? Authorizing Provider   vinodegreyes Stewart) 75 mg tab Take 75 mg by mouth daily. Patient not taking: Reported on 2/3/2022 1/12/22   Corey Houston MD      Physical Exam        Physical Exam  Vitals and nursing note reviewed. Constitutional:       Appearance: Normal appearance. HENT:      Head: Normocephalic. Nose: Nose normal.      Mouth/Throat:      Mouth: Mucous membranes are moist.   Eyes:      Pupils: Pupils are equal, round, and reactive to light. Cardiovascular:      Rate and Rhythm: Normal rate and regular rhythm.    Pulmonary:      Effort: Pulmonary effort is normal.   Abdominal:      General: Bowel sounds are normal.   Genitourinary:     Comments: Have slight cystocele nothing of note half plus  Musculoskeletal:         General: Normal range of motion. Skin:     General: Skin is warm. Neurological:      General: No focal deficit present. Mental Status: She is alert and oriented to person, place, and time. Psychiatric:         Mood and Affect: Mood normal.         Behavior: Behavior normal.         Thought Content: Thought content normal.         Judgment: Judgment normal.   ASSESSMENT and PLAN  Diagnoses and all orders for this visit:    1. Urinary frequency    2. Stress incontinence    3. Lower urinary tract symptoms    Follow up in 2 weeks. Patient will take gemtessa.  Discuss if patient wants her vaginal prolapse to be fixed

## 2022-02-27 NOTE — PROGRESS NOTES
Patient: Rafat Ghotra MRN: [id number]  SSN: [id number]    YOB: 1985  Age: 31 y.o. Sex: female      Chief Complaint   Patient presents with    Abdominal Pain     chronic lower abdominal discomfort, discharge, odor in urine      Rafat Ghotra is a 31 y.o. female who returns with continued complaint of suprapubic pressure when sitting. This has been a problem for the past 4 months. She has had UA, urine culture, Nuswab, pelvic exam and PAP sans identifiable etiology. She is scheduled for cystoscopy. She is requesting vaginal US. Patient without fever, chills, flank pain, nausea, vomiting, constipation, diarrhea, dysuria, abnormal vaginal bleeding or discharge. Medications:     Current Outpatient Medications   Medication Sig    vibegron (Gemtesa) 75 mg tab Take 75 mg by mouth daily. (Patient not taking: Reported on 2/3/2022)     No current facility-administered medications for this visit.        Problem List:     Patient Active Problem List    Diagnosis Date Noted    Dysuria 2022    Pelvic pain 2022    Stress incontinence 2022    Urinary frequency 2022    Contusion of left great toe without damage to nail 2019       Medical History:     Past Medical History:   Diagnosis Date    Burning with urination        Allergies:   No Known Allergies    Surgical History:     Past Surgical History:   Procedure Laterality Date    DELIVERY   07    UT  DELIVERY ONLY         Social History:     Social History     Socioeconomic History    Marital status:    Tobacco Use    Smoking status: Never Smoker    Smokeless tobacco: Never Used   Vaping Use    Vaping Use: Never used   Substance and Sexual Activity    Alcohol use: No    Drug use: No    Sexual activity: Yes     Partners: Male     Birth control/protection: None       Review of Symptoms:  Constitutional: No fever or chills  Cardiovascular: Negative for chest pain or palpitations  Respiratory: Negative for cough, wheezing or SOB  Gastrointestinal: Negative for nausea or abdominal pain  Genital/urinary: see HPI  Neurological: Negative for weakness or paresthesia     Vitals:    02/24/22 0929   BP: (!) 107/59   Pulse: 95   Resp: 20   Temp: 98.2 °F (36.8 °C)   TempSrc: Oral   SpO2: 99%   Weight: 146 lb 12.8 oz (66.6 kg)   Height: 5' 2\" (1.575 m)        Physical Examination:  General: Appears well, in no apparent distress. Eyes: Sclera clear  Cardiovascular: Regular rate and rhythm  Respiratory: Symmetrical, unlabored effort   Extremities: Full range of motion  Neuro: Active, alert, and oriented          ASSESSMENT:   Diagnoses and all orders for this visit:    1. Suprapubic pressure  -     AMB POC URINALYSIS DIP STICK AUTO W/ MICRO  -     AMB POC URINE, MICROALBUMIN, SEMIQUANT (3 RESULTS)  -     US TRANSVAGINAL; Future  -     AMB POC URINE PREGNANCY TEST, VISUAL COLOR COMPARISON      Plan of care:  Diagnoses were discussed in detail with patient. All of the patient's questions were addressed and answered to apparent satisfaction. The patient understands and agrees with our plan of care. The patient knows to call back if they have questions about the plan of care or if symptoms change. The patient received an After-Visit Summary which contains VS, diagnoses, orders, allergy and medication lists.       Future Appointments   Date Time Provider Angelo Chandler   3/1/2022  2:45 PM MD DAWSON Galeas AMB

## 2022-03-01 ENCOUNTER — OFFICE VISIT (OUTPATIENT)
Dept: UROLOGY | Age: 37
End: 2022-03-01

## 2022-03-01 VITALS
OXYGEN SATURATION: 98 % | BODY MASS INDEX: 23.46 KG/M2 | DIASTOLIC BLOOD PRESSURE: 64 MMHG | SYSTOLIC BLOOD PRESSURE: 109 MMHG | TEMPERATURE: 97.7 F | WEIGHT: 146 LBS | HEART RATE: 91 BPM | HEIGHT: 66 IN

## 2022-03-01 DIAGNOSIS — N81.2 CYSTOCELE WITH INCOMPLETE UTEROVAGINAL PROLAPSE: ICD-10-CM

## 2022-03-01 DIAGNOSIS — N39.3 STRESS INCONTINENCE: ICD-10-CM

## 2022-03-01 DIAGNOSIS — R35.0 URINARY FREQUENCY: Primary | ICD-10-CM

## 2022-03-01 DIAGNOSIS — R39.9 LOWER URINARY TRACT SYMPTOMS: ICD-10-CM

## 2022-03-01 LAB
AVG FLOW RATE POC: NORMAL ML/SECONDS
BILIRUB UR QL: NEGATIVE
FLOW TIME POC: NORMAL SECONDS
GLUCOSE UR-MCNC: NEGATIVE MG/DL
KETONES P FAST UR STRIP-MCNC: NEGATIVE MG/DL
PEAK FLOW: NORMAL ML/SECONDS
PH UR STRIP: 7 [PH] (ref 4.6–8)
PROT UR QL STRIP: NEGATIVE
PVR POC: NORMAL CC
SP GR UR STRIP: 1.02 (ref 1–1.03)
TIME TO PEAK: NORMAL SECONDS
TOTAL VOLUME POC: NORMAL ML
UA UROBILINOGEN AMB POC: NORMAL (ref 0.2–1)
URINALYSIS CLARITY POC: CLEAR
URINALYSIS COLOR POC: YELLOW
URINE BLOOD POC: NEGATIVE
URINE LEUKOCYTES POC: NORMAL
URINE NITRITES POC: NEGATIVE
VOIDING TIME POC: NORMAL SECONDS

## 2022-03-01 PROCEDURE — 81003 URINALYSIS AUTO W/O SCOPE: CPT | Performed by: UROLOGY

## 2022-03-01 PROCEDURE — 51798 US URINE CAPACITY MEASURE: CPT | Performed by: UROLOGY

## 2022-03-01 PROCEDURE — 51725 SIMPLE CYSTOMETROGRAM: CPT | Performed by: UROLOGY

## 2022-03-01 PROCEDURE — 51700 IRRIGATION OF BLADDER: CPT | Performed by: UROLOGY

## 2022-03-01 PROCEDURE — 99214 OFFICE O/P EST MOD 30 MIN: CPT | Performed by: UROLOGY

## 2022-03-01 PROCEDURE — 52000 CYSTOURETHROSCOPY: CPT | Performed by: UROLOGY

## 2022-03-01 PROCEDURE — 51741 ELECTRO-UROFLOWMETRY FIRST: CPT | Performed by: UROLOGY

## 2022-03-01 RX ORDER — CIPROFLOXACIN 500 MG/1
500 TABLET ORAL
Status: COMPLETED | OUTPATIENT
Start: 2022-03-01 | End: 2022-03-01

## 2022-03-01 RX ADMIN — CIPROFLOXACIN 500 MG: 500 TABLET ORAL at 16:06

## 2022-03-01 NOTE — PROGRESS NOTES
Chief Complaint   Patient presents with    Cystoscopy     ros    Urinary Frequency    Stress Incontinence       PHQ-9 score is  PHQ 9 Score: 1 Negative    Vitals:    03/01/22 1428   BP: 109/64   Pulse: 91   Temp: 97.7 °F (36.5 °C)   TempSrc: Temporal   SpO2: 98%   Weight: 146 lb (66.2 kg)   Height: 5' 6\" (1.676 m)   PainSc:   0 - No pain   LMP: 02/18/2022      1. \"Have you been to the ER, urgent care clinic since your last visit? Hospitalized since your last visit? \" No    2. \"Have you seen or consulted any other health care providers outside of the [address] [place] [address] since your last visit? \" No     3. For patients aged 39-70: Has the patient had a colonoscopy / FIT/ Cologuard? No      If the patient is female:    4. For patients aged 41-77: Has the patient had a mammogram within the past 2 years? No      5. For patients aged 21-65: Has the patient had a pap smear?  Yes - no Care Gap present

## 2022-03-03 LAB — BACTERIA UR CULT: NORMAL

## 2022-03-14 PROBLEM — N81.2 CYSTOCELE WITH INCOMPLETE UTEROVAGINAL PROLAPSE: Status: ACTIVE | Noted: 2022-03-14

## 2022-03-14 NOTE — PROGRESS NOTES
Cystoscopy - Female    Findings:  Initial residual: minimal  Anterior urethra: normal mucosa  Bladder neck: Normal appearing  Bladder mucosa: Intact, no bas fond deformity, did not descend with strain  Trabeculation: none  Diverticula: none  Ureteral orifices: normal, efflux clear urine    CMG    Initial urge at (cc): 230  Strong urge at (cc): 275  Findings: No uninhibited contractions noted.   No urge related incontinence noted    Uroflow/ PVR    Max Flow: 18 ml/sec    Avg flow: 13 ml/sec    Voided Volume:  266ml    Residual Volume:14ml    Shape of the curve: Normal bell shaped curve    Impression: history of urge incontinence, but cmg appeared wnl

## 2022-03-17 ENCOUNTER — HOSPITAL ENCOUNTER (OUTPATIENT)
Dept: ULTRASOUND IMAGING | Age: 37
Discharge: HOME | End: 2022-03-17
Attending: FAMILY MEDICINE

## 2022-03-17 ENCOUNTER — TELEPHONE (OUTPATIENT)
Dept: FAMILY MEDICINE CLINIC | Age: 37
End: 2022-03-17

## 2022-03-17 DIAGNOSIS — R10.2 SUPRAPUBIC PRESSURE: ICD-10-CM

## 2022-03-17 PROCEDURE — 76830 TRANSVAGINAL US NON-OB: CPT

## 2022-03-17 NOTE — TELEPHONE ENCOUNTER
Attempted to call. No answer. Message left. Advise patient per Dr Vern Ernst shows a 1.3 x 0.5 cm calcification in the posterior uterus described as a fibroid. Otherwise normal findings. \"

## 2022-03-17 NOTE — PROGRESS NOTES
Vaginal US shows a 1.3 x 0.5 cm calcification in the posterior uterus described as a fibroid. Otherwise normal findings.

## 2022-03-18 PROBLEM — R30.0 DYSURIA: Status: ACTIVE | Noted: 2022-01-31

## 2022-03-18 PROBLEM — R35.0 URINARY FREQUENCY: Status: ACTIVE | Noted: 2022-01-11

## 2022-03-18 PROBLEM — R10.2 PELVIC PAIN: Status: ACTIVE | Noted: 2022-01-31

## 2022-03-19 PROBLEM — S90.112A CONTUSION OF LEFT GREAT TOE WITHOUT DAMAGE TO NAIL: Status: ACTIVE | Noted: 2019-05-31

## 2022-03-19 PROBLEM — R39.9 LOWER URINARY TRACT SYMPTOMS: Status: ACTIVE | Noted: 2022-02-27

## 2022-03-19 PROBLEM — N39.3 STRESS INCONTINENCE: Status: ACTIVE | Noted: 2022-01-31

## 2022-03-19 PROBLEM — N81.2 CYSTOCELE WITH INCOMPLETE UTEROVAGINAL PROLAPSE: Status: ACTIVE | Noted: 2022-03-14

## 2022-04-20 ENCOUNTER — OFFICE VISIT (OUTPATIENT)
Dept: FAMILY MEDICINE CLINIC | Age: 37
End: 2022-04-20

## 2022-04-20 VITALS
HEART RATE: 79 BPM | OXYGEN SATURATION: 98 % | TEMPERATURE: 98.2 F | HEIGHT: 66 IN | WEIGHT: 150.4 LBS | RESPIRATION RATE: 16 BRPM | SYSTOLIC BLOOD PRESSURE: 99 MMHG | BODY MASS INDEX: 24.17 KG/M2 | DIASTOLIC BLOOD PRESSURE: 64 MMHG

## 2022-04-20 DIAGNOSIS — L29.9 PRURITIC DERMATITIS: Primary | ICD-10-CM

## 2022-04-20 PROCEDURE — 99213 OFFICE O/P EST LOW 20 MIN: CPT | Performed by: FAMILY MEDICINE

## 2022-04-20 RX ORDER — PREDNISONE 20 MG/1
20 TABLET ORAL 2 TIMES DAILY
Qty: 10 TABLET | Refills: 0 | Status: SHIPPED | OUTPATIENT
Start: 2022-04-20 | End: 2022-07-12 | Stop reason: ALTCHOICE

## 2022-04-20 NOTE — PROGRESS NOTES
1. Have you been to the ER, urgent care clinic since your last visit? Hospitalized since your last visit? No    2. Have you seen or consulted any other health care providers outside of the [address] [place] [address] since your last visit? Include any pap smears or colon screening. No    3. For patients aged 39-70: Has the patient had a colonoscopy / FIT/ Cologuard? NA - based on age    If the patient is female:    4. For patients aged 41-77: Has the patient had a mammogram within the past 2 years? NA - based on age or sex      11. For patients aged 21-65: Has the patient had a pap smear? bfpc     Reviewed record in preparation for visit and have necessary documentation  Pt did not bring medication to office visit for review  Patient is accompanied by self I have received verbal consent from Stephanie Alarcon to discuss any/all medical information while they are present in the room.     Goals that were addressed and/or need to be completed during or after this appointment include     Health Maintenance Due   Topic Date Due    Hepatitis C Screening  Never done    COVID-19 Vaccine (1) Never done    DTaP/Tdap/Td series (1 - Tdap) Never done

## 2022-04-26 NOTE — PROGRESS NOTES
Patient: Cielo Connelly MRN: [id number]  SSN: [id number]    YOB: 1985  Age: 31 y.o. Sex: female      Chief Complaint   Patient presents with    Poison Ivy/Poison Oak/Poison Sumac Exposure     on back of left leg - came up on       she is a 31y.o. year old female who presents with complaint of pruritic rash on right proximal volar forearm and left inferior buttock for 3  day(s). Patient concerned this is poison ivy. Patient sans other complaints or concerns at this time. Patient Active Problem List   Diagnosis Code    Contusion of left great toe without damage to nail S90.112A    Urinary frequency R35.0    Dysuria R30.0    Pelvic pain R10.2    Stress incontinence N39.3    Lower urinary tract symptoms R39.9    Cystocele with incomplete uterovaginal prolapse N81.2     Past Surgical History:   Procedure Laterality Date    DELIVERY   07    HX UROLOGICAL  2022    cystoscopy    AL  DELIVERY ONLY       Social History     Socioeconomic History    Marital status:      Spouse name: Not on file    Number of children: Not on file    Years of education: Not on file    Highest education level: Not on file   Occupational History    Not on file   Tobacco Use    Smoking status: Never Smoker    Smokeless tobacco: Never Used   Vaping Use    Vaping Use: Never used   Substance and Sexual Activity    Alcohol use: No    Drug use: No    Sexual activity: Yes     Partners: Male     Birth control/protection: None   Other Topics Concern    Not on file   Social History Narrative    Not on file     Social Determinants of Health     Financial Resource Strain:     Difficulty of Paying Living Expenses: Not on file   Food Insecurity:     Worried About Running Out of Food in the Last Year: Not on file    Roland of Food in the Last Year: Not on file   Transportation Needs:     Lack of Transportation (Medical):  Not on file    Lack of Transportation (Non-Medical): Not on file   Physical Activity:     Days of Exercise per Week: Not on file    Minutes of Exercise per Session: Not on file   Stress:     Feeling of Stress : Not on file   Social Connections:     Frequency of Communication with Friends and Family: Not on file    Frequency of Social Gatherings with Friends and Family: Not on file    Attends [employer] Services: Not on file    Active Member of [address] [place] [address] [email or web address] or Organizations: Not on file    Attends Club or Organization Meetings: Not on file    Marital Status: Not on file   Intimate Partner Violence:     Fear of Current or Ex-Partner: Not on file    Emotionally Abused: Not on file    Physically Abused: Not on file    Sexually Abused: Not on file   Housing Stability:     Unable to Pay for Housing in the Last Year: Not on file    Number of [place] in the Last Year: Not on file    Unstable Housing in the Last Year: Not on file     Family History   Problem Relation Age of Onset    Cancer Father         lung    Migraines Maternal Uncle     Migraines Maternal Grandmother      Current Outpatient Medications   Medication Sig    predniSONE (DELTASONE) 20 mg tablet Take 20 mg by mouth two (2) times a day.  vibegron (Gemtesa) 75 mg tab Take 75 mg by mouth daily. (Patient not taking: Reported on 2/3/2022)     No current facility-administered medications for this visit.      No Known Allergies    Review of Systems:  Constitutional: feeling well, denies fatigue, malaise  Skin: see HPI  Respiratory: Negative for cough or SOB  Cardiovascular: Negative for dizziness or palpitations      Vitals:    04/20/22 1133   BP: 99/64   Pulse: 79   Resp: 16   Temp: 98.2 °F (36.8 °C)   TempSrc: Oral   SpO2: 98%   Weight: 150 lb 6.4 oz (68.2 kg)   Height: 5' 6\" (1.676 m)       Physical Examination:  General: Well developed, well nourished, in no acute distress  Skin: raised lesion with surrounding erythema on right proximal volar forearm, ~10.0 cm erythematous patch left inferior buttock  Head: Normocephalic, atraumatic  Eyes: Sclera clear, EOMI  Respiratory: Symmetrical effort  Cardiovascular: Regular rate   Extremities: Full range of motion, Normal gait  Neurological: Active, alert and oriented. Diagnoses and all orders for this visit:    1. Pruritic dermatitis  -     predniSONE (DELTASONE) 20 mg tablet; Take 20 mg by mouth two (2) times a day. I have discussed the diagnosis with the patient and the intended plan as seen in the above orders. The patient expresses understanding and agreement with our plan of care. The patient has received an after-visit summary. The patient knows to call our office if there are any questions or concerns regarding diagnosis and treatment plans. I have discussed medication side effects and warnings with the patient as well.

## 2022-05-24 ENCOUNTER — OFFICE VISIT (OUTPATIENT)
Dept: FAMILY MEDICINE CLINIC | Age: 37
End: 2022-05-24

## 2022-05-24 VITALS
RESPIRATION RATE: 18 BRPM | SYSTOLIC BLOOD PRESSURE: 97 MMHG | DIASTOLIC BLOOD PRESSURE: 58 MMHG | HEART RATE: 80 BPM | BODY MASS INDEX: 21.34 KG/M2 | TEMPERATURE: 97.7 F | WEIGHT: 132.8 LBS | HEIGHT: 66 IN | OXYGEN SATURATION: 99 %

## 2022-05-24 DIAGNOSIS — R53.83 FATIGUE, UNSPECIFIED TYPE: ICD-10-CM

## 2022-05-24 DIAGNOSIS — R74.01 ELEVATED AST (SGOT): Primary | ICD-10-CM

## 2022-05-24 DIAGNOSIS — R19.8 RECTAL PRESSURE: ICD-10-CM

## 2022-05-24 PROCEDURE — 99213 OFFICE O/P EST LOW 20 MIN: CPT | Performed by: STUDENT IN AN ORGANIZED HEALTH CARE EDUCATION/TRAINING PROGRAM

## 2022-05-24 NOTE — PROGRESS NOTES
1. Have you been to the ER, urgent care clinic since your last visit? Hospitalized since your last visit? No    2. Have you seen or consulted any other health care providers outside of the [address] [place] [address] since your last visit? Including any pap smears or colon screening.  No    Concerned about prolapsed  Health Maintenance Due   Topic Date Due    Hepatitis C Screening  Never done    COVID-19 Vaccine (1) Never done    DTaP/Tdap/Td series (1 - Tdap) Never done

## 2022-05-24 NOTE — PROGRESS NOTES
[address] [facility] [address] [place] [address], [facility] 24  P ([phone])  Date of visit:  5/26/2022    Sreedhar Nguyen is an 31 y.o. female presents for lab work and to discuss issue with pressure in the rectal area. AST level was mildly elevated on previous lab. In regards to pt rectal pressure, she is not concerned about rectal prolapse. Pressure in the rectal area:    - Hx of rectocele. Diagnosed a year ago   - Ongoing for 2 weeks   - Negative for hematochezia, no weight loss. No problems with diarrhea or constipation   - Saw GI a month ago for abdominal gas. Allergies   No Known Allergies    Medications  Current Outpatient Medications   Medication Sig    predniSONE (DELTASONE) 20 mg tablet Take 20 mg by mouth two (2) times a day. (Patient not taking: Reported on 5/24/2022)    vibegron (Gemtesa) 75 mg tab Take 75 mg by mouth daily. (Patient not taking: Reported on 2/3/2022)     No current facility-administered medications for this visit. Medical History  Past Medical History:   Diagnosis Date    Burning with urination        Immunizations   Immunization History   Administered Date(s) Administered    MMR 07/25/2015       Social History  Social History     Tobacco Use    Smoking status: Never Smoker    Smokeless tobacco: Never Used   Vaping Use    Vaping Use: Never used   Substance Use Topics    Alcohol use: No    Drug use: No       Objective     Visit Vitals  BP (!) 97/58 (BP 1 Location: Left arm, BP Patient Position: Sitting, BP Cuff Size: Adult)   Pulse 80   Temp 97.7 °F (36.5 °C) (Oral)   Resp 18   Ht 5' 6\" (1.676 m)   Wt 60.2 kg (132 lb 12.8 oz)   SpO2 99%   BMI 21.43 kg/m²       Physical Exam  Constitutional:       Appearance: Normal appearance. HENT:      Head: Normocephalic and atraumatic. Cardiovascular:      Rate and Rhythm: Normal rate. Pulses: Normal pulses. Pulmonary:      Effort: No respiratory distress.    Neurological:      General: No focal deficit present. Mental Status: She is alert. Assessment   Bhavya Mckeon is a 31 y.o. female who presents to the clinic for repeat lab and also to discuss issue with rectal pressure. Negative for hematochezia, melena, weight loss, abdominal pain. Plan     1. Elevated AST (SGOT)  AST level on 02/2022 was 33. Patient would like to re-check to ensure it is not upward trending.   - METABOLIC PANEL, COMPREHENSIVE; Future  - HEPATITIS C AB; Future  - HEPATITIS C AB  - METABOLIC PANEL, COMPREHENSIVE    2. Fatigue, unspecified type: Pt would like CBC checked to ensure she is not anemic.   - CBC W/O DIFF; Future  - CBC W/O DIFF    3. Rectal pressure: No alarming symptom. Less likely rectal prolapse or rectocele. - Follow up with GI        ICD-10-CM ICD-9-CM    1. Elevated AST (SGOT)  L34.88 258.1 METABOLIC PANEL, COMPREHENSIVE      HEPATITIS C AB      HEPATITIS C AB      METABOLIC PANEL, COMPREHENSIVE   2. Fatigue, unspecified type  R53.83 780.79 CBC W/O DIFF      CBC W/O DIFF   3. Rectal pressure  R19.8 787.99          I have discussed the aforementioned diagnoses and plan with the patient in detail. I have provided information in person and/or in AVS. All questions answered prior to discharge.     I discussed this patient with Dr. Patrick Veronica (Attending Physician)   Signed By:  Alvarez Freed MD    Family Medicine Resident

## 2022-05-25 LAB
ALBUMIN SERPL-MCNC: 3.6 G/DL (ref 3.5–5)
ALBUMIN/GLOB SERPL: 1.2 {RATIO} (ref 1.1–2.2)
ALP SERPL-CCNC: 61 U/L (ref 45–117)
ALT SERPL-CCNC: 20 U/L (ref 12–78)
ANION GAP SERPL CALC-SCNC: 6 MMOL/L (ref 5–15)
AST SERPL-CCNC: 12 U/L (ref 15–37)
BILIRUB SERPL-MCNC: 0.2 MG/DL (ref 0.2–1)
BUN SERPL-MCNC: 8 MG/DL (ref 6–20)
BUN/CREAT SERPL: 9 (ref 12–20)
CALCIUM SERPL-MCNC: 8.4 MG/DL (ref 8.5–10.1)
CHLORIDE SERPL-SCNC: 109 MMOL/L (ref 97–108)
CO2 SERPL-SCNC: 27 MMOL/L (ref 21–32)
CREAT SERPL-MCNC: 0.88 MG/DL (ref 0.55–1.02)
ERYTHROCYTE [DISTWIDTH] IN BLOOD BY AUTOMATED COUNT: 12.7 % (ref 11.5–14.5)
GLOBULIN SER CALC-MCNC: 3.1 G/DL (ref 2–4)
GLUCOSE SERPL-MCNC: 78 MG/DL (ref 65–100)
HCT VFR BLD AUTO: 41.2 % (ref 35–47)
HCV AB SERPL QL IA: NONREACTIVE
HGB BLD-MCNC: 12.7 G/DL (ref 11.5–16)
MCH RBC QN AUTO: 30.2 PG (ref 26–34)
MCHC RBC AUTO-ENTMCNC: 30.8 G/DL (ref 30–36.5)
MCV RBC AUTO: 98.1 FL (ref 80–99)
NRBC # BLD: 0 K/UL (ref 0–0.01)
NRBC BLD-RTO: 0 PER 100 WBC
PLATELET # BLD AUTO: 305 K/UL (ref 150–400)
PMV BLD AUTO: 10.9 FL (ref 8.9–12.9)
POTASSIUM SERPL-SCNC: 3.8 MMOL/L (ref 3.5–5.1)
PROT SERPL-MCNC: 6.7 G/DL (ref 6.4–8.2)
RBC # BLD AUTO: 4.2 M/UL (ref 3.8–5.2)
SODIUM SERPL-SCNC: 142 MMOL/L (ref 136–145)
WBC # BLD AUTO: 8.6 K/UL (ref 3.6–11)

## 2022-06-11 DIAGNOSIS — R53.83 FATIGUE, UNSPECIFIED TYPE: Primary | ICD-10-CM

## 2022-06-24 ENCOUNTER — HOSPITAL ENCOUNTER (EMERGENCY)
Age: 37
Discharge: LEFT BEFORE DISCHARGE | End: 2022-06-24
Attending: FAMILY MEDICINE

## 2022-06-24 VITALS
HEART RATE: 80 BPM | OXYGEN SATURATION: 100 % | RESPIRATION RATE: 16 BRPM | HEIGHT: 63 IN | TEMPERATURE: 98.1 F | BODY MASS INDEX: 26.22 KG/M2 | DIASTOLIC BLOOD PRESSURE: 72 MMHG | WEIGHT: 148 LBS | SYSTOLIC BLOOD PRESSURE: 114 MMHG

## 2022-06-24 PROCEDURE — 75810000275 HC EMERGENCY DEPT VISIT NO LEVEL OF CARE

## 2022-06-25 NOTE — ED TRIAGE NOTES
Ambulatory pt presents after being in the car all day with +1 dependent edema and slight yellow discoloration of feet and ankles, denies pain, denies liver problems.

## 2022-06-28 ENCOUNTER — TELEPHONE (OUTPATIENT)
Dept: FAMILY MEDICINE CLINIC | Age: 37
End: 2022-06-28

## 2022-06-28 NOTE — TELEPHONE ENCOUNTER
Pt did an at home stool test that came back positive. Pt states she is on her last day of her cycle and wanted to know if that may have interfered with the sample. Please advise.

## 2022-07-12 ENCOUNTER — OFFICE VISIT (OUTPATIENT)
Dept: FAMILY MEDICINE CLINIC | Age: 37
End: 2022-07-12

## 2022-07-12 VITALS
HEIGHT: 63 IN | DIASTOLIC BLOOD PRESSURE: 59 MMHG | TEMPERATURE: 98.3 F | BODY MASS INDEX: 26.75 KG/M2 | WEIGHT: 151 LBS | OXYGEN SATURATION: 97 % | SYSTOLIC BLOOD PRESSURE: 90 MMHG | HEART RATE: 90 BPM | RESPIRATION RATE: 20 BRPM

## 2022-07-12 DIAGNOSIS — R53.83 FATIGUE, UNSPECIFIED TYPE: ICD-10-CM

## 2022-07-12 DIAGNOSIS — K92.1 BLOOD IN STOOL: Primary | ICD-10-CM

## 2022-07-12 PROCEDURE — 99213 OFFICE O/P EST LOW 20 MIN: CPT | Performed by: STUDENT IN AN ORGANIZED HEALTH CARE EDUCATION/TRAINING PROGRAM

## 2022-07-12 NOTE — PATIENT INSTRUCTIONS
Learning About Foods That Are Good Sources of Fiber  What foods are high in fiber? The foods you eat contain nutrients, such as vitamins and minerals. Fiber is a nutrient. Your body needs the right amount to stay healthy and work as it should. You can use the list below to help you make choices about which foods to eat. Here are some examples of foods that are good sources of fiber. Fruits  · Apple  · Apricot  · Avocado  · Banana  · Blackberries  · Cherries  · Melon  · Pear  · Raspberries  Grains  · Amaranth  · Barley  · Bran cereal  · Farro  · Oat bran  · Oatmeal  · Quinoa  · Rice (brown or wild)  · Whole-grain bread  · Whole-grain English muffin  Protein foods  · Almonds  · Beans (black, kidney, navy, drummond)  · Dante seeds  · Garbanzo beans  · Lentils  · Pumpkin seeds  · Split peas  · Sunflower seeds  Vegetables  · Artichoke  · Broccoli  · [place] sprouts  · Cabbage  · Carrots  · Cauliflower  · Eggplant  · Green peas  · Kale  · Pumpkin  · Sweet potato  · White potato  Work with your doctor to find out how much of this nutrient you need. Depending on your health, you may need more or less of it in your diet. Where can you learn more? Go to http://www.gray.com/  Enter F355 in the search box to learn more about \"Learning About Foods That Are Good Sources of Fiber. \"  Current as of: September 8, 2021               Content Version: 13.2  © 2006-2022 Healthwise, Incorporated. Care instructions adapted under license by [employer] (which disclaims liability or warranty for this information). If you have questions about a medical condition or this instruction, always ask your healthcare professional. [place] [address] any warranty or liability for your use of this information.

## 2022-07-12 NOTE — PROGRESS NOTES
[address] [facility] [address] [facility], [facility] 24  P ([phone])  Date of visit:  7/13/2022    Sreedhar Campbell is a30 y.o. female who presents to the clinic for stool test. Patient is worried about over growth of bacteria. She is worried that there might be blood in her stool and would like to get tested for that. Per patient, sometimes she has loose stool and sometimes she has formed stool. Bowel movement everyday. Negative for hematochezia, melena, unintentional weight loss, no mucus in stool, no rectal pain. Review of Systems   Constitutional: Positive for malaise/fatigue. Negative for chills and fever. Cardiovascular: Negative for chest pain, palpitations and leg swelling. Gastrointestinal: Negative for abdominal pain, blood in stool, constipation, diarrhea, melena, nausea and vomiting. Neurological: Negative for dizziness and headaches. Allergies   No Known Allergies    Medications  No current outpatient medications on file. No current facility-administered medications for this visit. Medical History  Past Medical History:   Diagnosis Date    Burning with urination        Immunizations   Immunization History   Administered Date(s) Administered    MMR 07/25/2015       Social History  Social History     Tobacco Use    Smoking status: Never Smoker    Smokeless tobacco: Never Used   Vaping Use    Vaping Use: Never used   Substance Use Topics    Alcohol use: No    Drug use: No       Objective     Visit Vitals  BP (!) 90/59 (BP 1 Location: Right upper arm, BP Patient Position: Sitting, BP Cuff Size: Adult)   Pulse 90   Temp 98.3 °F (36.8 °C) (Oral)   Resp 20   Ht 5' 3\" (1.6 m)   Wt 151 lb (68.5 kg)   SpO2 97%   BMI 26.75 kg/m²       Physical Exam  Constitutional:       General: She is not in acute distress. Appearance: Normal appearance. She is normal weight. She is not ill-appearing, toxic-appearing or diaphoretic.    Abdominal:      General: Bowel sounds are normal. There is no distension. Palpations: Abdomen is soft. There is no mass. Tenderness: There is no abdominal tenderness. There is no guarding or rebound. Neurological:      Mental Status: She is alert. Assessment   Reji Leggett is a 30 y.o. who presents for evaluation for blood in stool. Plan     1. Fatigue, unspecified type  - TSH 3RD GENERATION    2. Blood in stool: No alarming features. Negative for rectal pain, abdominal pain or discomfort. No nausea or vomiting.   - OCCULT BLOOD IMMUNOASSAY,DIAGNOSTIC      Follow-up and Dispositions    · Return if symptoms worsen or fail to improve. I have discussed the aforementioned diagnoses and plan with the patient in detail. I have provided information in person and/or in AVS. All questions answered prior to discharge.     I discussed this patient with Dr. Nelson Esquivel (Attending Physician)   Signed By:  Derrick Farris MD    Family Medicine Resident

## 2022-07-13 DIAGNOSIS — R79.89 LOW TSH LEVEL: Primary | ICD-10-CM

## 2022-07-13 LAB — TSH SERPL DL<=0.05 MIU/L-ACNC: 0.31 UIU/ML (ref 0.36–3.74)

## 2022-07-15 LAB — HEMOCCULT STL QL IA: NEGATIVE

## 2022-12-13 ENCOUNTER — NURSE TRIAGE (OUTPATIENT)
Dept: OTHER | Facility: CLINIC | Age: 37
End: 2022-12-13

## 2022-12-13 NOTE — TELEPHONE ENCOUNTER
Received call from [facility] with Red Flag Complaint. Subjective: Caller states \"I think I am having an allergic reaction to an herbal supplement that I took. \"     Current Symptoms: diarrhea- 2-3X, vomiting- 3X4 times    Took herbal supplement-  Ashwagandha  Patient had not taken supplement before. Denies any s/s of anaphylaxis. Onset: Middle of the night     Associated Symptoms: lightheadedness, dry mouth    Pain Severity: 2/10; pain; intermittent    Temperature: Denies    What has been tried: N/A    Pregnant: No    Recommended disposition: See in Office Today    Care advice provided, patient verbalizes understanding; denies any other questions or concerns; instructed to call back for any new or worsening symptoms. Patient/Caller agrees with recommended disposition; writer provided warm transfer to [facility] for appointment scheduling    Attention Provider: Thank you for allowing me to participate in the care of your patient. The patient was connected to triage in response to information provided to the [facility]. Please do not respond through this encounter as the response is not directed to a shared pool.     Reason for Disposition   Patient wants to be seen    Protocols used: Vomiting-ADULT-[place]

## 2023-01-19 ENCOUNTER — OFFICE VISIT (OUTPATIENT)
Dept: FAMILY MEDICINE CLINIC | Age: 38
End: 2023-01-19

## 2023-01-19 VITALS
DIASTOLIC BLOOD PRESSURE: 58 MMHG | BODY MASS INDEX: 27.82 KG/M2 | SYSTOLIC BLOOD PRESSURE: 101 MMHG | HEART RATE: 103 BPM | RESPIRATION RATE: 18 BRPM | HEIGHT: 63 IN | WEIGHT: 157 LBS | OXYGEN SATURATION: 100 % | TEMPERATURE: 98.1 F

## 2023-01-19 DIAGNOSIS — R79.89 LOW TSH LEVEL: ICD-10-CM

## 2023-01-19 DIAGNOSIS — L29.9 PRURITIC DERMATITIS: Primary | ICD-10-CM

## 2023-01-19 PROBLEM — R35.0 URINARY FREQUENCY: Status: RESOLVED | Noted: 2022-01-11 | Resolved: 2023-01-19

## 2023-01-19 PROBLEM — R30.0 DYSURIA: Status: RESOLVED | Noted: 2022-01-31 | Resolved: 2023-01-19

## 2023-01-19 PROCEDURE — 99213 OFFICE O/P EST LOW 20 MIN: CPT | Performed by: FAMILY MEDICINE

## 2023-01-19 RX ORDER — MULTIVITAMIN
1 TABLET ORAL DAILY
COMMUNITY

## 2023-01-19 NOTE — PROGRESS NOTES
Chief Complaint   Patient presents with    Rash     Reports bumps that are itchy on arms and lower back. 5 days duration. 1. \"Have you been to the ER, urgent care clinic since your last visit? Hospitalized since your last visit? \" No    2. \"Have you seen or consulted any other health care providers outside of the [address] [place] [address] since your last visit? \" No     3. For patients aged 39-70: Has the patient had a colonoscopy / FIT/ Cologuard? NA - based on age      If the patient is female:    4. For patients aged 41-77: Has the patient had a mammogram within the past 2 years? NA - based on age or sex      11. For patients aged 21-65: Has the patient had a pap smear?  Yes - no Care Gap present    Health Maintenance Due   Topic Date Due    COVID-19 Vaccine (1) Never done    DTaP/Tdap/Td series (1 - Tdap) Never done no

## 2023-01-19 NOTE — PROGRESS NOTES
[facility]    History of Present Illness:   Tonya Fernandes is a 30 y.o. female here for   Chief Complaint   Patient presents with    Rash     Reports bumps that are itchy on arms and lower back. 5 days duration. HPI:  Patient complains of itching diffusely and small bumps on the arms and lower back as well as abdomen and legs. Says she started with symptoms about 5 days ago. no new detergents, creams, soaps. Has started using frankincense oil by mouth. She is worried that it may be scabies or mites. No one in the house has similar symptoms. Health Maintenance  Health Maintenance Due   Topic Date Due    COVID-19 Vaccine (1) Never done    DTaP/Tdap/Td series (1 - Tdap) Never done       Past Medical, Family, and Social History:     Past Medical History:   Diagnosis Date    Burning with urination       Past Surgical History:   Procedure Laterality Date    DELIVERY   07    HX UROLOGICAL  2022    cystoscopy    NE  DELIVERY ONLY         Current Outpatient Medications on File Prior to Visit   Medication Sig Dispense Refill    multivitamin (ONE A DAY) tablet Take 1 Tablet by mouth daily. No current facility-administered medications on file prior to visit.        Patient Active Problem List   Diagnosis Code    Contusion of left great toe without damage to nail S90.112A    Urinary frequency R35.0    Dysuria R30.0    Pelvic pain R10.2    Stress incontinence N39.3    Lower urinary tract symptoms R39.9    Cystocele with incomplete uterovaginal prolapse N81.2       Social History     Socioeconomic History    Marital status:    Tobacco Use    Smoking status: Never    Smokeless tobacco: Never   Vaping Use    Vaping Use: Never used   Substance and Sexual Activity    Alcohol use: No    Drug use: No    Sexual activity: Yes     Partners: Male     Birth control/protection: None     Social Determinants of Health     Financial Resource Strain: Low Risk Difficulty of Paying Living Expenses: Not hard at all   Food Insecurity: No Food Insecurity    Worried About Running Out of Food in the Last Year: Never true    Ran Out of Food in the Last Year: Never true        Review of Systems   Review of Systems   Constitutional:  Negative for chills and fever. Respiratory:  Negative for cough and shortness of breath. Skin:  Positive for itching and rash.      Objective:   Visit Vitals  BP (!) 101/58 (BP 1 Location: Left upper arm, BP Patient Position: Sitting, BP Cuff Size: Adult)   Pulse (!) 103   Temp 98.1 °F (36.7 °C) (Oral)   Resp 18   Ht 5' 3\" (1.6 m)   Wt 157 lb (71.2 kg)   LMP 01/09/2023 (Approximate)   SpO2 100%   Breastfeeding No   BMI 27.81 kg/m²        Physical Exam  PHYSICAL EXAM:  Gen: Pt sitting in chair, in NAD  Head: Normocephalic, atraumatic  Eyes: Sclera anicteric, EOM grossly intact,  CVS: Normal S1, S2, no m/r/g  Resp: CTAB, no wheezes or rales  Skin: Warm, dry few scattered slightly erythematous papules on different areas, more easily palpable than visualized  Neuro: Alert, oriented, appropriate    Pertinent Labs/Studies:  3 most recent PHQ Screens 1/19/2023   Little interest or pleasure in doing things Not at all   Feeling down, depressed, irritable, or hopeless Not at all   Total Score PHQ 2 0   Trouble falling or staying asleep, or sleeping too much -   Feeling tired or having little energy -   Poor appetite, weight loss, or overeating -   Feeling bad about yourself - or that you are a failure or have let yourself or your family down -   Trouble concentrating on things such as school, work, reading, or watching TV -   Moving or speaking so slowly that other people could have noticed; or the opposite being so fidgety that others notice -   Thoughts of being better off dead, or hurting yourself in some way -   PHQ 9 Score -   How difficult have these problems made it for you to do your work, take care of your home and get along with others - Assessment and orders:       ICD-10-CM ICD-9-CM    1. Pruritic dermatitis  L29.9 698.9       2. Low TSH level  R79.89 794.5 T4, FREE      T3 TOTAL        Diagnoses and all orders for this visit:    1. Pruritic dermatitis  Reassurance. The following changes in treatment are made: stop herbal med, RECOMMENDATIONS given include: Recommended BID use of moisturizer. Topical steroid therapy HC otc and d/w patient only using this for up to two weeks at a time daily and then take at least a month off before using it again to avoid and steroid induced skin changes. 2. Low TSH level  -     T4, FREE  -     T3 TOTAL    reviewed medications and side effects in detail  Spent 22 min with patient, reviewing chart and face to face exam, clinical documentation. I have discussed the diagnosis with the patient and the intended plan as seen in the above orders. Social history, medical history, and labs were reviewed. The patient has received an after-visit summary and questions were answered concerning future plans. I have discussed medication side effects and warnings with the patient as well. Patient/guardian verbalized understanding and accepts plan & risks. Please note that this dictation was completed with [employer], the [employer] voice recognition software. Quite often unanticipated grammatical, syntax, homophones, and other interpretive errors are inadvertently transcribed by the computer software. Please disregard these errors. Please excuse any errors that have escaped final proofreading. Thank you.      MD NANCY Barnett [facility]  01/19/23

## 2023-01-19 NOTE — PATIENT INSTRUCTIONS
RECOMMENDATIONS given include: Recommended BID use of moisturizer. Topical steroid therapy hydrocortisone cream 1% and d/w patient only using this for up to two weeks at a time daily and then take at least a month off before using it again to avoid and steroid induced skin changes.

## 2023-01-20 LAB — T4 FREE SERPL-MCNC: 1.1 NG/DL (ref 0.8–1.5)

## 2023-01-22 LAB — T3 SERPL-MCNC: 117 NG/DL (ref 71–180)

## 2023-02-02 ENCOUNTER — OFFICE VISIT (OUTPATIENT)
Dept: FAMILY MEDICINE CLINIC | Age: 38
End: 2023-02-02

## 2023-02-02 VITALS
TEMPERATURE: 97.5 F | RESPIRATION RATE: 18 BRPM | OXYGEN SATURATION: 100 % | BODY MASS INDEX: 27.64 KG/M2 | HEIGHT: 63 IN | SYSTOLIC BLOOD PRESSURE: 90 MMHG | HEART RATE: 81 BPM | DIASTOLIC BLOOD PRESSURE: 58 MMHG | WEIGHT: 156 LBS

## 2023-02-02 DIAGNOSIS — R30.0 DYSURIA: Primary | ICD-10-CM

## 2023-02-02 DIAGNOSIS — R10.2 SUPRAPUBIC PAIN: ICD-10-CM

## 2023-02-02 LAB
BILIRUB UR QL STRIP: NEGATIVE
GLUCOSE UR-MCNC: NEGATIVE MG/DL
KETONES P FAST UR STRIP-MCNC: NEGATIVE MG/DL
PH UR STRIP: 5.5 [PH] (ref 4.6–8)
PROT UR QL STRIP: NEGATIVE
SP GR UR STRIP: 1.03 (ref 1–1.03)
UA UROBILINOGEN AMB POC: NORMAL (ref 0.2–1)
URINALYSIS CLARITY POC: CLEAR
URINALYSIS COLOR POC: YELLOW
URINE BLOOD POC: NEGATIVE
URINE LEUKOCYTES POC: NORMAL
URINE NITRITES POC: NEGATIVE

## 2023-02-02 PROCEDURE — 81003 URINALYSIS AUTO W/O SCOPE: CPT | Performed by: FAMILY MEDICINE

## 2023-02-02 PROCEDURE — 99212 OFFICE O/P EST SF 10 MIN: CPT | Performed by: FAMILY MEDICINE

## 2023-02-02 NOTE — PROGRESS NOTES
[facility]    History of Present Illness:   Madhavi Cuenca is a 30 y.o. female here for   Chief Complaint   Patient presents with    Abdominal Pain     Lower medial aspect. X4 days duration. Reports as aching sensation, constant. LBM 23 normal consistency and color. Denies any menstruation related pains. HPI:  C/o lower abd pain x 4 days. C/o achy discomfort. Constant, bothers her while sitting. LMP 23, regular. No contraception. Little gassy, loose bms  No melena, BRBPR    Health Maintenance  Health Maintenance Due   Topic Date Due    COVID-19 Vaccine (1) Never done    DTaP/Tdap/Td series (1 - Tdap) Never done       Past Medical, Family, and Social History:     Past Medical History:   Diagnosis Date    Burning with urination       Past Surgical History:   Procedure Laterality Date    DELIVERY   07    HX UROLOGICAL  2022    cystoscopy    [place]  DELIVERY ONLY         Current Outpatient Medications on File Prior to Visit   Medication Sig Dispense Refill    multivitamin (ONE A DAY) tablet Take 1 Tablet by mouth daily. No current facility-administered medications on file prior to visit.        Patient Active Problem List   Diagnosis Code    Contusion of left great toe without damage to nail S90.112A    Pelvic pain R10.2    Stress incontinence N39.3    Lower urinary tract symptoms R39.9    Cystocele with incomplete uterovaginal prolapse N81.2       Social History     Socioeconomic History    Marital status:    Tobacco Use    Smoking status: Never    Smokeless tobacco: Never   Vaping Use    Vaping Use: Never used   Substance and Sexual Activity    Alcohol use: No    Drug use: No    Sexual activity: Yes     Partners: Male     Birth control/protection: None     Social Determinants of Health     Financial Resource Strain: Low Risk     Difficulty of Paying Living Expenses: Not hard at all   Food Insecurity: No Food Insecurity    Worried About Running Out of Food in the Last Year: Never true    Ran Out of Food in the Last Year: Never true        Review of Systems   Review of Systems   Constitutional:  Negative for chills and fever. Gastrointestinal:  Positive for abdominal pain. Negative for blood in stool, constipation, diarrhea, melena, nausea and vomiting. Genitourinary:  Positive for frequency. Negative for dysuria, hematuria and urgency.      Objective:   Visit Vitals  BP (!) 90/58 (BP 1 Location: Right arm, BP Patient Position: Sitting, BP Cuff Size: Adult)   Pulse 81   Temp 97.5 °F (36.4 °C) (Oral)   Resp 18   Ht 5' 3\" (1.6 m)   Wt 156 lb (70.8 kg)   LMP 01/09/2023 (Approximate)   SpO2 100%   BMI 27.63 kg/m²        Physical Exam  PHYSICAL EXAM:  Gen: Pt sitting in chair, in NAD  Head: Normocephalic, atraumatic  Eyes: Sclera anicteric, EOM grossly intact,  CVS: Normal S1, S2, no m/r/g  Resp: CTAB, no wheezes or rales  Abd: Soft, mild suprapubic tenderness, non-distended, +BS  Neuro: Alert, oriented, appropriate    Pertinent Labs/Studies:  3 most recent PHQ Screens 2/2/2023   Little interest or pleasure in doing things Not at all   Feeling down, depressed, irritable, or hopeless Not at all   Total Score PHQ 2 0   Trouble falling or staying asleep, or sleeping too much -   Feeling tired or having little energy -   Poor appetite, weight loss, or overeating -   Feeling bad about yourself - or that you are a failure or have let yourself or your family down -   Trouble concentrating on things such as school, work, reading, or watching TV -   Moving or speaking so slowly that other people could have noticed; or the opposite being so fidgety that others notice -   Thoughts of being better off dead, or hurting yourself in some way -   PHQ 9 Score -   How difficult have these problems made it for you to do your work, take care of your home and get along with others -      Testing preformed onsite at today's visit:  Results for orders placed or performed in visit on 02/02/23   AMB POC URINALYSIS DIP STICK AUTO W/O MICRO   Result Value Ref Range    Color (UA POC) Yellow     Clarity (UA POC) Clear     Glucose (UA POC) Negative Negative    Bilirubin (UA POC) Negative Negative    Ketones (UA POC) Negative Negative    Specific gravity (UA POC) 1.030 1.001 - 1.035    Blood (UA POC) Negative Negative    pH (UA POC) 5.5 4.6 - 8.0    Protein (UA POC) Negative Negative    Urobilinogen (UA POC) 0.2 mg/dL 0.2 - 1    Nitrites (UA POC) Negative Negative    Leukocyte esterase (UA POC) 1+ Negative       Assessment and orders:       ICD-10-CM ICD-9-CM    1. Dysuria  R30.0 788.1 AMB POC URINALYSIS DIP STICK AUTO W/O MICRO      CULTURE, URINE      CULTURE, URINE      2. Suprapubic pain  R10.2 789.09 CBC WITH AUTOMATED DIFF      METABOLIC PANEL, COMPREHENSIVE        Diagnoses and all orders for this visit:    1. Dysuria  -     AMB POC URINALYSIS DIP STICK AUTO W/O MICRO  -     CULTURE, URINE; Future    2. Suprapubic pain  -     CBC WITH AUTOMATED DIFF; Future  -     METABOLIC PANEL, COMPREHENSIVE; Future  RECOMMENDATIONS given include: F/U with MD if symptoms worsen or fail to improve or resolve. F/U ASAP with MD/ED for increasing back pain, N/V or visible hematuria     lab results and schedule of future lab studies reviewed with patient    Advised if symptoms persist to return to clinic on Monday for additional labs including UPT if no menses by then. Deferred labs today due to cost and patient is self pay. I have discussed the diagnosis with the patient and the intended plan as seen in the above orders. Social history, medical history, and labs were reviewed. The patient has received an after-visit summary and questions were answered concerning future plans. I have discussed medication side effects and warnings with the patient as well. Patient/guardian verbalized understanding and accepts plan & risks.    Please note that this dictation was completed with [employer], the computer voice recognition software. Quite often unanticipated grammatical, syntax, homophones, and other interpretive errors are inadvertently transcribed by the computer software. Please disregard these errors. Please excuse any errors that have escaped final proofreading. Thank you.      MD NANCY [facility]  02/02/23

## 2023-02-02 NOTE — PROGRESS NOTES
Chief Complaint   Patient presents with    Abdominal Pain     Lower medial aspect. X4 days duration. Reports as aching sensation, constant. 1. \"Have you been to the ER, urgent care clinic since your last visit? Hospitalized since your last visit? \" No    2. \"Have you seen or consulted any other health care providers outside of the [address] [place] [address] since your last visit? \" No     3. For patients aged 39-70: Has the patient had a colonoscopy / FIT/ Cologuard? NA - based on age      If the patient is female:    4. For patients aged 41-77: Has the patient had a mammogram within the past 2 years? NA - based on age or sex      11. For patients aged 21-65: Has the patient had a pap smear?  Yes - no Care Gap present    Health Maintenance Due   Topic Date Due    COVID-19 Vaccine (1) Never done    DTaP/Tdap/Td series (1 - Tdap) Never done

## 2023-02-04 LAB
BACTERIA SPEC CULT: NORMAL
SERVICE CMNT-IMP: NORMAL

## 2023-02-28 ENCOUNTER — PATIENT MESSAGE (OUTPATIENT)
Dept: FAMILY MEDICINE CLINIC | Age: 38
End: 2023-02-28

## 2023-03-06 ENCOUNTER — APPOINTMENT (OUTPATIENT)
Dept: CT IMAGING | Age: 38
End: 2023-03-06
Attending: EMERGENCY MEDICINE

## 2023-03-06 ENCOUNTER — PATIENT MESSAGE (OUTPATIENT)
Dept: FAMILY MEDICINE CLINIC | Age: 38
End: 2023-03-06

## 2023-03-06 ENCOUNTER — HOSPITAL ENCOUNTER (EMERGENCY)
Age: 38
Discharge: HOME | End: 2023-03-06
Attending: EMERGENCY MEDICINE

## 2023-03-06 VITALS
SYSTOLIC BLOOD PRESSURE: 123 MMHG | HEART RATE: 79 BPM | WEIGHT: 155 LBS | BODY MASS INDEX: 27.46 KG/M2 | DIASTOLIC BLOOD PRESSURE: 71 MMHG | RESPIRATION RATE: 16 BRPM | HEIGHT: 63 IN | TEMPERATURE: 98.2 F | OXYGEN SATURATION: 100 %

## 2023-03-06 DIAGNOSIS — K62.89 RECTAL PAIN: Primary | ICD-10-CM

## 2023-03-06 LAB
CA-I BLD-MCNC: 1.18 MMOL/L (ref 1.12–1.32)
CO2 BLD-SCNC: 26.4 MMOL/L (ref 21–32)
CREAT BLD-MCNC: 0.76 MG/DL (ref 0.6–1.3)
GLUCOSE BLD-MCNC: 97 MG/DL (ref 65–100)
HCG UR QL: NEGATIVE
POTASSIUM BLD-SCNC: 3.5 MMOL/L (ref 3.5–5.1)
SERVICE CMNT-IMP: NORMAL
SODIUM BLD-SCNC: 141 MMOL/L (ref 136–145)

## 2023-03-06 PROCEDURE — 80047 BASIC METABLC PNL IONIZED CA: CPT

## 2023-03-06 PROCEDURE — 81025 URINE PREGNANCY TEST: CPT

## 2023-03-06 PROCEDURE — 74177 CT ABD & PELVIS W/CONTRAST: CPT

## 2023-03-06 PROCEDURE — 74011000636 HC RX REV CODE- 636: Performed by: EMERGENCY MEDICINE

## 2023-03-06 PROCEDURE — 99285 EMERGENCY DEPT VISIT HI MDM: CPT

## 2023-03-06 RX ADMIN — IOPAMIDOL 100 ML: 755 INJECTION, SOLUTION INTRAVENOUS at 15:27

## 2023-03-06 NOTE — ED TRIAGE NOTES
Pt arrives with rectal pain/pressure for approx 1 week that has been progressively getting worse  States hx of hemorrhoids  States she is on the last day of her menstrual cycle

## 2023-03-06 NOTE — ED PROVIDER NOTES
The history is provided by the patient. Anal Pain   The current episode started more than 1 week ago. The onset was gradual. The problem occurs continuously. The problem has been gradually worsening. The pain is severe. The stool is described as soft. Associated symptoms include hemorrhoids and rectal pain. Pertinent negatives include no anorexia and no fever. Past Medical History:   Diagnosis Date    Burning with urination        Past Surgical History:   Procedure Laterality Date    DELIVERY   07    HX UROLOGICAL  2022    cystoscopy    [place]  DELIVERY ONLY           Family History:   Problem Relation Age of Onset    Cancer Father         lung    Migraines Maternal Uncle     Migraines Maternal Grandmother        Social History     Socioeconomic History    Marital status:      Spouse name: Not on file    Number of children: Not on file    Years of education: Not on file    Highest education level: Not on file   Occupational History    Not on file   Tobacco Use    Smoking status: Never    Smokeless tobacco: Never   Vaping Use    Vaping Use: Never used   Substance and Sexual Activity    Alcohol use: No    Drug use: No    Sexual activity: Yes     Partners: Male     Birth control/protection: None   Other Topics Concern    Not on file   Social History Narrative    Not on file     Social Determinants of Health     Financial Resource Strain: Low Risk     Difficulty of Paying Living Expenses: Not hard at all   Food Insecurity: No Food Insecurity    Worried About Running Out of Food in the Last Year: Never true    Roland of Food in the Last Year: Never true   Transportation Needs: Not on file   Physical Activity: Not on file   Stress: Not on file   Social Connections: Not on file   Intimate Partner Violence: Not on file   Housing Stability: Not on file         ALLERGIES: Patient has no known allergies. Review of Systems   Constitutional:  Negative for fever. Gastrointestinal:  Positive for hemorrhoids and rectal pain. Negative for anal bleeding, anorexia and blood in stool. All other systems reviewed and are negative. Vitals:    03/06/23 1256   BP: 123/71   Pulse: 79   Resp: 16   Temp: 98.2 °F (36.8 °C)   SpO2: 100%   Weight: 70.3 kg (155 lb)   Height: 5' 3\" (1.6 m)            Physical Exam  Vitals and nursing note reviewed. Constitutional:       Appearance: She is well-developed. She is not ill-appearing. HENT:      Head: Normocephalic and atraumatic. Eyes:      General: No scleral icterus. Cardiovascular:      Rate and Rhythm: Normal rate. Pulmonary:      Effort: Pulmonary effort is normal.   Abdominal:      General: There is no distension. Genitourinary:     Rectum: External hemorrhoid present. No mass, tenderness or anal fissure. Normal anal tone. Musculoskeletal:      Cervical back: Normal range of motion. Skin:     General: Skin is warm and dry. Findings: No erythema or rash. Neurological:      General: No focal deficit present. Mental Status: She is alert and oriented to person, place, and time. Psychiatric:         Mood and Affect: Mood normal.         Behavior: Behavior normal.        Medical Decision Making  Amount and/or Complexity of Data Reviewed  Labs: ordered. Radiology: ordered. Risk  Prescription drug management. Procedures        Patient describes feeling an unusual mass finding herself rectal exam in the setting of her pain. She also reported intermittent loose stools without blood or mucus. I did not palpate any masses on exam but will obtain a CT of her abdomen pelvis to further evaluate for potential mass. The patient is resting comfortably and feels better, is alert and in no distress. The repeat examination is unremarkable and benign; in particular, there is no discomfort at McBurney's point.  The history, exam, diagnostic testing, and current condition do not suggest acute appendicitis, bowel obstruction, incarcerated hernia, acute cholecystitis, bowel perforation, major gastrointestinal bleeding, severe diverticulitis, sepsis, or other significant pathology to warrant further testing, continued ED treatment, admission, or surgical evaluation at this point. The vital signs have been stable and are within normal limits at this time. The patient does not have uncontrollable pain, intractable vomiting, or other significant symptoms. The patient's condition is stable and appropriate for discharge. The patient will pursue further outpatient evaluation with the gastroenterologist as indicated in the discharge instructions. IMPRESSION:  1.  Rectal pain

## 2023-05-18 ENCOUNTER — PATIENT MESSAGE (OUTPATIENT)
Facility: CLINIC | Age: 38
End: 2023-05-18

## 2023-05-18 DIAGNOSIS — L23.7 POISON IVY DERMATITIS: Primary | ICD-10-CM

## 2023-05-18 RX ORDER — HYDROCORTISONE 10 MG/G
GEL TOPICAL
Qty: 14 G | Refills: 0 | Status: SHIPPED | OUTPATIENT
Start: 2023-05-18

## 2023-05-18 NOTE — TELEPHONE ENCOUNTER
From: Shreyas Dey  To: Dr. Litzy Thompson: 5/18/2023 10:04 AM EDT  Subject: Radha Zuñiga. I have broken out with poison ivy rash. Ayden highly allergic. Could you please send in a prescription. I also have it on my legs.

## 2023-11-16 ENCOUNTER — APPOINTMENT (OUTPATIENT)
Facility: HOSPITAL | Age: 38
End: 2023-11-16

## 2023-11-16 ENCOUNTER — HOSPITAL ENCOUNTER (EMERGENCY)
Facility: HOSPITAL | Age: 38
Discharge: HOME | End: 2023-11-16
Attending: STUDENT IN AN ORGANIZED HEALTH CARE EDUCATION/TRAINING PROGRAM

## 2023-11-16 ENCOUNTER — NURSE TRIAGE (OUTPATIENT)
Dept: OTHER | Facility: CLINIC | Age: 38
End: 2023-11-16

## 2023-11-16 VITALS
HEIGHT: 63 IN | RESPIRATION RATE: 18 BRPM | HEART RATE: 88 BPM | BODY MASS INDEX: 28.35 KG/M2 | SYSTOLIC BLOOD PRESSURE: 125 MMHG | TEMPERATURE: 98.9 F | OXYGEN SATURATION: 100 % | DIASTOLIC BLOOD PRESSURE: 81 MMHG | WEIGHT: 160 LBS

## 2023-11-16 DIAGNOSIS — O23.01 PYELONEPHRITIS AFFECTING PREGNANCY IN FIRST TRIMESTER: Primary | ICD-10-CM

## 2023-11-16 DIAGNOSIS — R10.31 ABDOMINAL PAIN, RIGHT LOWER QUADRANT: ICD-10-CM

## 2023-11-16 LAB
ALBUMIN SERPL-MCNC: 3.5 G/DL (ref 3.5–5)
ALBUMIN/GLOB SERPL: 0.9 (ref 1.1–2.2)
ALP SERPL-CCNC: 53 U/L (ref 45–117)
ALT SERPL-CCNC: 22 U/L (ref 12–78)
ANION GAP SERPL CALC-SCNC: 6 MMOL/L (ref 5–15)
APPEARANCE UR: CLEAR
AST SERPL-CCNC: 10 U/L (ref 15–37)
BACTERIA URNS QL MICRO: ABNORMAL /HPF
BASOPHILS # BLD: 0.1 K/UL (ref 0–0.1)
BASOPHILS NFR BLD: 1 % (ref 0–1)
BILIRUB SERPL-MCNC: 0.3 MG/DL (ref 0.2–1)
BILIRUB UR QL: NEGATIVE
BUN SERPL-MCNC: 9 MG/DL (ref 6–20)
BUN/CREAT SERPL: 17 (ref 12–20)
CALCIUM SERPL-MCNC: 8.5 MG/DL (ref 8.5–10.1)
CHLORIDE SERPL-SCNC: 107 MMOL/L (ref 97–108)
CO2 SERPL-SCNC: 25 MMOL/L (ref 21–32)
COLOR UR: ABNORMAL
CREAT SERPL-MCNC: 0.54 MG/DL (ref 0.55–1.02)
DIFFERENTIAL METHOD BLD: NORMAL
EOSINOPHIL # BLD: 0 K/UL (ref 0–0.4)
EOSINOPHIL NFR BLD: 0 % (ref 0–7)
EPITH CASTS URNS QL MICRO: ABNORMAL /LPF
ERYTHROCYTE [DISTWIDTH] IN BLOOD BY AUTOMATED COUNT: 12.2 % (ref 11.5–14.5)
GLOBULIN SER CALC-MCNC: 3.7 G/DL (ref 2–4)
GLUCOSE SERPL-MCNC: 91 MG/DL (ref 65–100)
GLUCOSE UR STRIP.AUTO-MCNC: NEGATIVE MG/DL
HCG SERPL-ACNC: 7489 MIU/ML (ref 0–6)
HCT VFR BLD AUTO: 40 % (ref 35–47)
HGB BLD-MCNC: 13 G/DL (ref 11.5–16)
HGB UR QL STRIP: NEGATIVE
HYALINE CASTS URNS QL MICRO: ABNORMAL /LPF (ref 0–2)
IMM GRANULOCYTES # BLD AUTO: 0 K/UL (ref 0–0.04)
IMM GRANULOCYTES NFR BLD AUTO: 0 % (ref 0–0.5)
KETONES UR QL STRIP.AUTO: ABNORMAL MG/DL
LEUKOCYTE ESTERASE UR QL STRIP.AUTO: ABNORMAL
LIPASE SERPL-CCNC: 15 U/L (ref 13–75)
LYMPHOCYTES # BLD: 2.1 K/UL (ref 0.8–3.5)
LYMPHOCYTES NFR BLD: 22 % (ref 12–49)
MCH RBC QN AUTO: 29.4 PG (ref 26–34)
MCHC RBC AUTO-ENTMCNC: 32.5 G/DL (ref 30–36.5)
MCV RBC AUTO: 90.5 FL (ref 80–99)
MONOCYTES # BLD: 0.9 K/UL (ref 0–1)
MONOCYTES NFR BLD: 9 % (ref 5–13)
NEUTS SEG # BLD: 6.3 K/UL (ref 1.8–8)
NEUTS SEG NFR BLD: 68 % (ref 32–75)
NITRITE UR QL STRIP.AUTO: NEGATIVE
NRBC # BLD: 0 K/UL (ref 0–0.01)
NRBC BLD-RTO: 0 PER 100 WBC
PH UR STRIP: 6 (ref 5–8)
PLATELET # BLD AUTO: 332 K/UL (ref 150–400)
PMV BLD AUTO: 10.4 FL (ref 8.9–12.9)
POTASSIUM SERPL-SCNC: 3.6 MMOL/L (ref 3.5–5.1)
PROT SERPL-MCNC: 7.2 G/DL (ref 6.4–8.2)
PROT UR STRIP-MCNC: NEGATIVE MG/DL
RBC # BLD AUTO: 4.42 M/UL (ref 3.8–5.2)
RBC #/AREA URNS HPF: ABNORMAL /HPF (ref 0–5)
SODIUM SERPL-SCNC: 138 MMOL/L (ref 136–145)
SP GR UR REFRACTOMETRY: 1.02 (ref 1–1.03)
UROBILINOGEN UR QL STRIP.AUTO: 1 EU/DL (ref 0.2–1)
WBC # BLD AUTO: 9.4 K/UL (ref 3.6–11)
WBC URNS QL MICRO: ABNORMAL /HPF (ref 0–4)

## 2023-11-16 PROCEDURE — 80053 COMPREHEN METABOLIC PANEL: CPT

## 2023-11-16 PROCEDURE — 76817 TRANSVAGINAL US OBSTETRIC: CPT

## 2023-11-16 PROCEDURE — 81001 URINALYSIS AUTO W/SCOPE: CPT

## 2023-11-16 PROCEDURE — 6360000002 HC RX W HCPCS: Performed by: STUDENT IN AN ORGANIZED HEALTH CARE EDUCATION/TRAINING PROGRAM

## 2023-11-16 PROCEDURE — 85025 COMPLETE CBC W/AUTO DIFF WBC: CPT

## 2023-11-16 PROCEDURE — 87086 URINE CULTURE/COLONY COUNT: CPT

## 2023-11-16 PROCEDURE — 96374 THER/PROPH/DIAG INJ IV PUSH: CPT

## 2023-11-16 PROCEDURE — 2580000003 HC RX 258: Performed by: STUDENT IN AN ORGANIZED HEALTH CARE EDUCATION/TRAINING PROGRAM

## 2023-11-16 PROCEDURE — 6370000000 HC RX 637 (ALT 250 FOR IP): Performed by: STUDENT IN AN ORGANIZED HEALTH CARE EDUCATION/TRAINING PROGRAM

## 2023-11-16 PROCEDURE — 36415 COLL VENOUS BLD VENIPUNCTURE: CPT

## 2023-11-16 PROCEDURE — 84702 CHORIONIC GONADOTROPIN TEST: CPT

## 2023-11-16 PROCEDURE — 99284 EMERGENCY DEPT VISIT MOD MDM: CPT

## 2023-11-16 PROCEDURE — 83690 ASSAY OF LIPASE: CPT

## 2023-11-16 RX ORDER — ACETAMINOPHEN 500 MG
1000 TABLET ORAL
Status: COMPLETED | OUTPATIENT
Start: 2023-11-16 | End: 2023-11-16

## 2023-11-16 RX ORDER — 0.9 % SODIUM CHLORIDE 0.9 %
1000 INTRAVENOUS SOLUTION INTRAVENOUS ONCE
Status: COMPLETED | OUTPATIENT
Start: 2023-11-16 | End: 2023-11-16

## 2023-11-16 RX ORDER — CEFPODOXIME PROXETIL 200 MG/1
200 TABLET, FILM COATED ORAL 2 TIMES DAILY
Qty: 20 TABLET | Refills: 0 | Status: SHIPPED | OUTPATIENT
Start: 2023-11-16 | End: 2023-11-26

## 2023-11-16 RX ADMIN — ACETAMINOPHEN 1000 MG: 500 TABLET ORAL at 16:58

## 2023-11-16 RX ADMIN — SODIUM CHLORIDE 1000 ML: 9 INJECTION, SOLUTION INTRAVENOUS at 17:42

## 2023-11-16 RX ADMIN — WATER 1000 MG: 1 INJECTION INTRAMUSCULAR; INTRAVENOUS; SUBCUTANEOUS at 17:42

## 2023-11-16 ASSESSMENT — PAIN - FUNCTIONAL ASSESSMENT: PAIN_FUNCTIONAL_ASSESSMENT: 0-10

## 2023-11-16 ASSESSMENT — ENCOUNTER SYMPTOMS
VOMITING: 0
BACK PAIN: 0
ANAL BLEEDING: 0
COUGH: 0
NAUSEA: 0
DIARRHEA: 0
COLOR CHANGE: 0
SHORTNESS OF BREATH: 0
ABDOMINAL PAIN: 1
ABDOMINAL DISTENTION: 0
BLOOD IN STOOL: 0
SORE THROAT: 0
CONSTIPATION: 0

## 2023-11-16 ASSESSMENT — PAIN DESCRIPTION - ORIENTATION: ORIENTATION: LOWER

## 2023-11-16 ASSESSMENT — PAIN SCALES - GENERAL: PAINLEVEL_OUTOF10: 6

## 2023-11-16 ASSESSMENT — PAIN DESCRIPTION - LOCATION: LOCATION: ABDOMEN

## 2023-11-16 NOTE — ED TRIAGE NOTES
Patient states began Monday with intermittent low abdominal pain. Patient states the pain worsened and is now in the right side and low back. Took a home pregnancy test, which was positive. Denies vaginal bleeding or discharge. Denies urinary symptoms. Denies nausea, vomiting, diarrhea, or constipation. LMP 10/10 or 10/11. G5, P4.

## 2023-11-16 NOTE — TELEPHONE ENCOUNTER
Location of patient: [address] [place] [address] [employer] call from [facility] with [employer] Complaint. Subjective: Caller states has a positive pregnancy test and is having lower abdominal pain not on one side or another. Pt states she is also having lower back pain. Pt states she is having clear discharge. Current Symptoms: Abdominal pain    Onset: 1 day ago;     Associated Symptoms: NA    Pain Severity: 6/10; sharp intermittent, aching, throbbing; constant. Pt states pain goes up to an 8/10 when it is sharp,    Temperature: Denies     What has been tried: Nothing    LMP:  10/15/23  Pregnant: Yes    Recommended disposition: Go to ED Now    Care advice provided, patient verbalizes understanding; denies any other questions or concerns; instructed to call back for any new or worsening symptoms. Patient/caller agrees to proceed to nearest Emergency Department    Attention Provider: Thank you for allowing me to participate in the care of your patient. The patient was connected to triage in response to information provided to the ECC/PSC. Please do not respond through this encounter as the response is not directed to a shared pool.           Reason for Disposition   MODERATE-SEVERE abdominal pain    Protocols used: Pregnancy - Abdominal Pain Less Than 20 Weeks EGA-ADULT-[place]

## 2023-11-16 NOTE — DISCHARGE INSTRUCTIONS
Take antibiotics as prescribed. Take acetaminophen 1000mg every 8 hours as needed for pain. Ensure you are staying hydrated. Schedule an appointment with your OBGYN for close follow-up.  If you develop new or worsening symptoms RETURN TO ER.

## 2023-11-17 LAB
BACTERIA SPEC CULT: NORMAL
CC UR VC: NORMAL
SERVICE CMNT-IMP: NORMAL

## 2023-11-17 NOTE — ED NOTES
Patient does not appear to be in any acute distress/shows no evidence of clinical instability at this time. Provider has reviewed discharge instructions with the patient/family. The patient/family verbalized understanding instructions as well as need for follow up for any further symptoms. Discharge papers given, education provided, and any questions answered. Patient discharged by provider.        Andrea Spaulding RN  11/16/23 0734

## 2023-11-28 ENCOUNTER — HOSPITAL ENCOUNTER (EMERGENCY)
Facility: HOSPITAL | Age: 38
Discharge: HOME | End: 2023-11-28
Attending: STUDENT IN AN ORGANIZED HEALTH CARE EDUCATION/TRAINING PROGRAM

## 2023-11-28 ENCOUNTER — APPOINTMENT (OUTPATIENT)
Facility: HOSPITAL | Age: 38
End: 2023-11-28

## 2023-11-28 VITALS
DIASTOLIC BLOOD PRESSURE: 69 MMHG | WEIGHT: 164.02 LBS | HEART RATE: 84 BPM | RESPIRATION RATE: 16 BRPM | OXYGEN SATURATION: 100 % | TEMPERATURE: 98.4 F | HEIGHT: 63 IN | SYSTOLIC BLOOD PRESSURE: 108 MMHG | BODY MASS INDEX: 29.06 KG/M2

## 2023-11-28 DIAGNOSIS — N39.0 URINARY TRACT INFECTION WITHOUT HEMATURIA, SITE UNSPECIFIED: Primary | ICD-10-CM

## 2023-11-28 LAB
ALBUMIN SERPL-MCNC: 3.4 G/DL (ref 3.5–5)
ALBUMIN/GLOB SERPL: 1 (ref 1.1–2.2)
ALP SERPL-CCNC: 55 U/L (ref 45–117)
ALT SERPL-CCNC: 37 U/L (ref 12–78)
ANION GAP SERPL CALC-SCNC: 6 MMOL/L (ref 5–15)
APPEARANCE UR: ABNORMAL
AST SERPL-CCNC: 15 U/L (ref 15–37)
BACTERIA URNS QL MICRO: ABNORMAL /HPF
BASOPHILS # BLD: 0.1 K/UL (ref 0–0.1)
BASOPHILS NFR BLD: 1 % (ref 0–1)
BILIRUB SERPL-MCNC: 0.3 MG/DL (ref 0.2–1)
BILIRUB UR QL: NEGATIVE
BUN SERPL-MCNC: 8 MG/DL (ref 6–20)
BUN/CREAT SERPL: 9 (ref 12–20)
CALCIUM SERPL-MCNC: 8.5 MG/DL (ref 8.5–10.1)
CHLORIDE SERPL-SCNC: 107 MMOL/L (ref 97–108)
CO2 SERPL-SCNC: 27 MMOL/L (ref 21–32)
COLOR UR: ABNORMAL
CREAT SERPL-MCNC: 0.88 MG/DL (ref 0.55–1.02)
DIFFERENTIAL METHOD BLD: ABNORMAL
EOSINOPHIL # BLD: 0 K/UL (ref 0–0.4)
EOSINOPHIL NFR BLD: 0 % (ref 0–7)
EPITH CASTS URNS QL MICRO: ABNORMAL /LPF
ERYTHROCYTE [DISTWIDTH] IN BLOOD BY AUTOMATED COUNT: 12.3 % (ref 11.5–14.5)
GLOBULIN SER CALC-MCNC: 3.5 G/DL (ref 2–4)
GLUCOSE SERPL-MCNC: 87 MG/DL (ref 65–100)
GLUCOSE UR STRIP.AUTO-MCNC: NEGATIVE MG/DL
HCT VFR BLD AUTO: 39.7 % (ref 35–47)
HGB BLD-MCNC: 13.6 G/DL (ref 11.5–16)
HGB UR QL STRIP: NEGATIVE
IMM GRANULOCYTES # BLD AUTO: 0 K/UL (ref 0–0.04)
IMM GRANULOCYTES NFR BLD AUTO: 0 % (ref 0–0.5)
KETONES UR QL STRIP.AUTO: ABNORMAL MG/DL
LEUKOCYTE ESTERASE UR QL STRIP.AUTO: ABNORMAL
LIPASE SERPL-CCNC: 18 U/L (ref 13–75)
LYMPHOCYTES # BLD: 1.8 K/UL (ref 0.8–3.5)
LYMPHOCYTES NFR BLD: 16 % (ref 12–49)
MCH RBC QN AUTO: 31.2 PG (ref 26–34)
MCHC RBC AUTO-ENTMCNC: 34.3 G/DL (ref 30–36.5)
MCV RBC AUTO: 91.1 FL (ref 80–99)
MONOCYTES # BLD: 1.1 K/UL (ref 0–1)
MONOCYTES NFR BLD: 9 % (ref 5–13)
NEUTS SEG # BLD: 8.3 K/UL (ref 1.8–8)
NEUTS SEG NFR BLD: 74 % (ref 32–75)
NITRITE UR QL STRIP.AUTO: NEGATIVE
NRBC # BLD: 0 K/UL (ref 0–0.01)
NRBC BLD-RTO: 0 PER 100 WBC
PH UR STRIP: 7 (ref 5–8)
PLATELET # BLD AUTO: 304 K/UL (ref 150–400)
PMV BLD AUTO: 10.1 FL (ref 8.9–12.9)
POTASSIUM SERPL-SCNC: 3.9 MMOL/L (ref 3.5–5.1)
PROT SERPL-MCNC: 6.9 G/DL (ref 6.4–8.2)
PROT UR STRIP-MCNC: NEGATIVE MG/DL
RBC # BLD AUTO: 4.36 M/UL (ref 3.8–5.2)
RBC #/AREA URNS HPF: ABNORMAL /HPF (ref 0–5)
SODIUM SERPL-SCNC: 140 MMOL/L (ref 136–145)
SP GR UR REFRACTOMETRY: 1.02 (ref 1–1.03)
SPECIMEN HOLD: NORMAL
UROBILINOGEN UR QL STRIP.AUTO: 1 EU/DL (ref 0.2–1)
WBC # BLD AUTO: 11.3 K/UL (ref 3.6–11)
WBC URNS QL MICRO: ABNORMAL /HPF (ref 0–4)

## 2023-11-28 PROCEDURE — 80053 COMPREHEN METABOLIC PANEL: CPT

## 2023-11-28 PROCEDURE — 83690 ASSAY OF LIPASE: CPT

## 2023-11-28 PROCEDURE — 36415 COLL VENOUS BLD VENIPUNCTURE: CPT

## 2023-11-28 PROCEDURE — 99284 EMERGENCY DEPT VISIT MOD MDM: CPT

## 2023-11-28 PROCEDURE — 76817 TRANSVAGINAL US OBSTETRIC: CPT

## 2023-11-28 PROCEDURE — 85025 COMPLETE CBC W/AUTO DIFF WBC: CPT

## 2023-11-28 PROCEDURE — 81001 URINALYSIS AUTO W/SCOPE: CPT

## 2023-11-28 RX ORDER — CEPHALEXIN 500 MG/1
500 CAPSULE ORAL 4 TIMES DAILY
Qty: 28 CAPSULE | Refills: 0 | Status: SHIPPED | OUTPATIENT
Start: 2023-11-28 | End: 2023-12-05

## 2023-11-28 ASSESSMENT — PAIN DESCRIPTION - LOCATION: LOCATION: ABDOMEN

## 2023-11-28 ASSESSMENT — PAIN DESCRIPTION - ORIENTATION: ORIENTATION: UPPER

## 2023-11-28 ASSESSMENT — PAIN - FUNCTIONAL ASSESSMENT: PAIN_FUNCTIONAL_ASSESSMENT: 0-10

## 2023-11-28 ASSESSMENT — PAIN SCALES - GENERAL: PAINLEVEL_OUTOF10: 5

## 2023-11-28 NOTE — ED NOTES
Pt was discharged by BING Lal  Pt verbalized good understanding of all discharge instructions, prescriptions, and follow up care. All questions answered. Pt in stable condition on discharge.       Tate Melo RN  11/28/23 4151

## 2023-11-28 NOTE — ED TRIAGE NOTES
Pt arrives to the ER for complaints of upper abdominal pain that started more than 12 days ago. Pt reports that she was seen in the ER for the same complaint and had a vaginal US completed. Pt reports that she is roughly 6 weeks pregnant.

## 2023-11-28 NOTE — ED PROVIDER NOTES
--       There is no height or weight on file to calculate BMI. Physical Exam        EMERGENCY DEPARTMENT COURSE and DIFFERENTIAL DIAGNOSIS/MDM:   Vitals: There were no vitals filed for this visit. Medical Decision Making  Amount and/or Complexity of Data Reviewed  Labs: ordered. Radiology: ordered. Risk  Prescription drug management. REASSESSMENT          CONSULTS:  None    PROCEDURES:     Procedures    Labs Reviewed   CBC WITH AUTO DIFFERENTIAL - Abnormal; Notable for the following components:       Result Value    WBC 11.3 (*)     Neutrophils Absolute 8.3 (*)     Monocytes Absolute 1.1 (*)     All other components within normal limits   COMPREHENSIVE METABOLIC PANEL - Abnormal; Notable for the following components:    Bun/Cre Ratio 9 (*)     Albumin 3.4 (*)     Albumin/Globulin Ratio 1.0 (*)     All other components within normal limits   URINALYSIS WITH MICROSCOPIC - Abnormal; Notable for the following components:    Appearance CLOUDY (*)     Ketones, Urine TRACE (*)     Leukocyte Esterase, Urine MODERATE (*)     Epithelial Cells UA MANY (*)     BACTERIA, URINE 1+ (*)     All other components within normal limits   URINE CULTURE HOLD SAMPLE   LIPASE       US OB TRANSVAGINAL   Final Result   Single live intrauterine pregnancy with an estimated gestational age of 6 weeks   and 1 days. Calcification in the uterine fundus may represent a small fibroid. 12:59 PM  Pt has been reexamined. Pt has no new complaints, changes or physical findings. Care plan outlined and precautions discussed. All available results were reviewed with pt. All medications were reviewed with pt. All of pt's questions and concerns were addressed. Pt agrees to F/U as instructed and agrees to return to ED upon further deterioration. Pt is ready to go home.   BING Tijerina NP    (Please note that portions of this note were completed with a voice recognition program.  Efforts were made to edit the

## 2023-12-09 ASSESSMENT — ENCOUNTER SYMPTOMS
TROUBLE SWALLOWING: 0
ABDOMINAL DISTENTION: 0
SINUS PRESSURE: 0
EYE PAIN: 0
EYE REDNESS: 0
COLOR CHANGE: 0
VOMITING: 0
COUGH: 0
SINUS PAIN: 0
SHORTNESS OF BREATH: 0
ABDOMINAL PAIN: 1
NAUSEA: 0

## 2024-01-09 LAB
ABO, EXTERNAL RESULT: NORMAL
C. TRACHOMATIS, EXTERNAL RESULT: NEGATIVE
HEP B, EXTERNAL RESULT: NEGATIVE
HIV, EXTERNAL RESULT: NEGATIVE
N. GONORRHOEAE, EXTERNAL RESULT: NEGATIVE
RH FACTOR, EXTERNAL RESULT: POSITIVE
RPR, EXTERNAL RESULT: NON REACTIVE
RUBELLA TITER, EXTERNAL RESULT: NORMAL

## 2024-01-11 ENCOUNTER — HOSPITAL ENCOUNTER (OUTPATIENT)
Facility: HOSPITAL | Age: 39
Discharge: HOME | End: 2024-01-14

## 2024-01-11 DIAGNOSIS — K76.89 LIVER CYST: ICD-10-CM

## 2024-02-13 ENCOUNTER — APPOINTMENT (OUTPATIENT)
Facility: HOSPITAL | Age: 39
End: 2024-02-13
Payer: COMMERCIAL

## 2024-02-13 ENCOUNTER — HOSPITAL ENCOUNTER (EMERGENCY)
Facility: HOSPITAL | Age: 39
Discharge: HOME | End: 2024-02-14
Attending: EMERGENCY MEDICINE
Payer: COMMERCIAL

## 2024-02-13 DIAGNOSIS — N10 ACUTE PYELONEPHRITIS: Primary | ICD-10-CM

## 2024-02-13 LAB
ALBUMIN SERPL-MCNC: 2.9 G/DL (ref 3.5–5)
ALBUMIN/GLOB SERPL: 0.8 (ref 1.1–2.2)
ALP SERPL-CCNC: 70 U/L (ref 45–117)
ALT SERPL-CCNC: 40 U/L (ref 12–78)
ANION GAP SERPL CALC-SCNC: 6 MMOL/L (ref 5–15)
APPEARANCE UR: CLEAR
AST SERPL-CCNC: 24 U/L (ref 15–37)
BACTERIA URNS QL MICRO: ABNORMAL /HPF
BASOPHILS # BLD: 0.1 K/UL (ref 0–0.1)
BASOPHILS NFR BLD: 0 % (ref 0–1)
BILIRUB SERPL-MCNC: 0.3 MG/DL (ref 0.2–1)
BILIRUB UR QL: NEGATIVE
BUN SERPL-MCNC: 6 MG/DL (ref 6–20)
BUN/CREAT SERPL: 14 (ref 12–20)
CALCIUM SERPL-MCNC: 8.6 MG/DL (ref 8.5–10.1)
CHLORIDE SERPL-SCNC: 108 MMOL/L (ref 97–108)
CO2 SERPL-SCNC: 22 MMOL/L (ref 21–32)
COLOR UR: ABNORMAL
COMMENT:: NORMAL
CREAT SERPL-MCNC: 0.44 MG/DL (ref 0.55–1.02)
DIFFERENTIAL METHOD BLD: ABNORMAL
EOSINOPHIL # BLD: 0 K/UL (ref 0–0.4)
EOSINOPHIL NFR BLD: 0 % (ref 0–7)
EPITH CASTS URNS QL MICRO: ABNORMAL /LPF
ERYTHROCYTE [DISTWIDTH] IN BLOOD BY AUTOMATED COUNT: 12.9 % (ref 11.5–14.5)
FLUAV AG NPH QL IA: NEGATIVE
FLUBV AG NOSE QL IA: NEGATIVE
GLOBULIN SER CALC-MCNC: 3.5 G/DL (ref 2–4)
GLUCOSE SERPL-MCNC: 93 MG/DL (ref 65–100)
GLUCOSE UR STRIP.AUTO-MCNC: NEGATIVE MG/DL
HCT VFR BLD AUTO: 35.2 % (ref 35–47)
HGB BLD-MCNC: 12 G/DL (ref 11.5–16)
HGB UR QL STRIP: NEGATIVE
HYALINE CASTS URNS QL MICRO: ABNORMAL /LPF (ref 0–2)
IMM GRANULOCYTES # BLD AUTO: 0.1 K/UL (ref 0–0.04)
IMM GRANULOCYTES NFR BLD AUTO: 1 % (ref 0–0.5)
KETONES UR QL STRIP.AUTO: 40 MG/DL
LEUKOCYTE ESTERASE UR QL STRIP.AUTO: ABNORMAL
LIPASE SERPL-CCNC: 14 U/L (ref 13–75)
LYMPHOCYTES # BLD: 0.9 K/UL (ref 0.8–3.5)
LYMPHOCYTES NFR BLD: 5 % (ref 12–49)
MCH RBC QN AUTO: 30.2 PG (ref 26–34)
MCHC RBC AUTO-ENTMCNC: 34.1 G/DL (ref 30–36.5)
MCV RBC AUTO: 88.7 FL (ref 80–99)
MONOCYTES # BLD: 1.1 K/UL (ref 0–1)
MONOCYTES NFR BLD: 7 % (ref 5–13)
NEUTS SEG # BLD: 14.5 K/UL (ref 1.8–8)
NEUTS SEG NFR BLD: 87 % (ref 32–75)
NITRITE UR QL STRIP.AUTO: NEGATIVE
NRBC # BLD: 0 K/UL (ref 0–0.01)
NRBC BLD-RTO: 0 PER 100 WBC
PH UR STRIP: 7 (ref 5–8)
PLATELET # BLD AUTO: 269 K/UL (ref 150–400)
PMV BLD AUTO: 10.2 FL (ref 8.9–12.9)
POTASSIUM SERPL-SCNC: 3.2 MMOL/L (ref 3.5–5.1)
PROT SERPL-MCNC: 6.4 G/DL (ref 6.4–8.2)
PROT UR STRIP-MCNC: NEGATIVE MG/DL
RBC # BLD AUTO: 3.97 M/UL (ref 3.8–5.2)
RBC #/AREA URNS HPF: ABNORMAL /HPF (ref 0–5)
SARS-COV-2 RDRP RESP QL NAA+PROBE: NOT DETECTED
SODIUM SERPL-SCNC: 136 MMOL/L (ref 136–145)
SOURCE: NORMAL
SP GR UR REFRACTOMETRY: 1.01 (ref 1–1.03)
SPECIMEN HOLD: NORMAL
SPECIMEN HOLD: NORMAL
TROPONIN I SERPL HS-MCNC: 4 NG/L (ref 0–51)
UROBILINOGEN UR QL STRIP.AUTO: 0.2 EU/DL (ref 0.2–1)
WBC # BLD AUTO: 16.7 K/UL (ref 3.6–11)
WBC URNS QL MICRO: ABNORMAL /HPF (ref 0–4)

## 2024-02-13 PROCEDURE — 96374 THER/PROPH/DIAG INJ IV PUSH: CPT

## 2024-02-13 PROCEDURE — 93005 ELECTROCARDIOGRAM TRACING: CPT | Performed by: EMERGENCY MEDICINE

## 2024-02-13 PROCEDURE — 6360000002 HC RX W HCPCS: Performed by: EMERGENCY MEDICINE

## 2024-02-13 PROCEDURE — 84484 ASSAY OF TROPONIN QUANT: CPT

## 2024-02-13 PROCEDURE — 87070 CULTURE OTHR SPECIMN AEROBIC: CPT

## 2024-02-13 PROCEDURE — 96375 TX/PRO/DX INJ NEW DRUG ADDON: CPT

## 2024-02-13 PROCEDURE — 87635 SARS-COV-2 COVID-19 AMP PRB: CPT

## 2024-02-13 PROCEDURE — 81001 URINALYSIS AUTO W/SCOPE: CPT

## 2024-02-13 PROCEDURE — 76770 US EXAM ABDO BACK WALL COMP: CPT

## 2024-02-13 PROCEDURE — 96361 HYDRATE IV INFUSION ADD-ON: CPT

## 2024-02-13 PROCEDURE — 87804 INFLUENZA ASSAY W/OPTIC: CPT

## 2024-02-13 PROCEDURE — 6370000000 HC RX 637 (ALT 250 FOR IP): Performed by: EMERGENCY MEDICINE

## 2024-02-13 PROCEDURE — 2580000003 HC RX 258: Performed by: EMERGENCY MEDICINE

## 2024-02-13 PROCEDURE — 36415 COLL VENOUS BLD VENIPUNCTURE: CPT

## 2024-02-13 PROCEDURE — 87086 URINE CULTURE/COLONY COUNT: CPT

## 2024-02-13 PROCEDURE — 99285 EMERGENCY DEPT VISIT HI MDM: CPT

## 2024-02-13 PROCEDURE — 83690 ASSAY OF LIPASE: CPT

## 2024-02-13 PROCEDURE — 87880 STREP A ASSAY W/OPTIC: CPT

## 2024-02-13 PROCEDURE — 85025 COMPLETE CBC W/AUTO DIFF WBC: CPT

## 2024-02-13 PROCEDURE — 80053 COMPREHEN METABOLIC PANEL: CPT

## 2024-02-13 RX ORDER — 0.9 % SODIUM CHLORIDE 0.9 %
1000 INTRAVENOUS SOLUTION INTRAVENOUS ONCE
Status: COMPLETED | OUTPATIENT
Start: 2024-02-13 | End: 2024-02-14

## 2024-02-13 RX ORDER — ONDANSETRON 2 MG/ML
4 INJECTION INTRAMUSCULAR; INTRAVENOUS
Status: COMPLETED | OUTPATIENT
Start: 2024-02-13 | End: 2024-02-13

## 2024-02-13 RX ORDER — ACETAMINOPHEN 500 MG
1000 TABLET ORAL
Status: COMPLETED | OUTPATIENT
Start: 2024-02-13 | End: 2024-02-13

## 2024-02-13 RX ADMIN — WATER 1000 MG: 1 INJECTION INTRAMUSCULAR; INTRAVENOUS; SUBCUTANEOUS at 23:23

## 2024-02-13 RX ADMIN — SODIUM CHLORIDE 1000 ML: 9 INJECTION, SOLUTION INTRAVENOUS at 22:32

## 2024-02-13 RX ADMIN — ONDANSETRON 4 MG: 2 INJECTION INTRAMUSCULAR; INTRAVENOUS at 22:32

## 2024-02-13 RX ADMIN — ACETAMINOPHEN 1000 MG: 500 TABLET ORAL at 22:32

## 2024-02-14 ENCOUNTER — APPOINTMENT (OUTPATIENT)
Facility: HOSPITAL | Age: 39
End: 2024-02-14
Payer: COMMERCIAL

## 2024-02-14 VITALS
WEIGHT: 173 LBS | RESPIRATION RATE: 17 BRPM | BODY MASS INDEX: 30.65 KG/M2 | HEIGHT: 63 IN | HEART RATE: 98 BPM | SYSTOLIC BLOOD PRESSURE: 102 MMHG | TEMPERATURE: 98.1 F | DIASTOLIC BLOOD PRESSURE: 54 MMHG | OXYGEN SATURATION: 99 %

## 2024-02-14 LAB
DEPRECATED S PYO AG THROAT QL EIA: NEGATIVE
EKG ATRIAL RATE: 98 BPM
EKG DIAGNOSIS: NORMAL
EKG P AXIS: 75 DEGREES
EKG P-R INTERVAL: 154 MS
EKG Q-T INTERVAL: 350 MS
EKG QRS DURATION: 80 MS
EKG QTC CALCULATION (BAZETT): 446 MS
EKG R AXIS: 65 DEGREES
EKG T AXIS: 73 DEGREES
EKG VENTRICULAR RATE: 98 BPM
TROPONIN I SERPL HS-MCNC: 4 NG/L (ref 0–51)

## 2024-02-14 PROCEDURE — 93010 ELECTROCARDIOGRAM REPORT: CPT | Performed by: STUDENT IN AN ORGANIZED HEALTH CARE EDUCATION/TRAINING PROGRAM

## 2024-02-14 PROCEDURE — 6370000000 HC RX 637 (ALT 250 FOR IP): Performed by: EMERGENCY MEDICINE

## 2024-02-14 PROCEDURE — 76705 ECHO EXAM OF ABDOMEN: CPT

## 2024-02-14 RX ORDER — POTASSIUM CHLORIDE 750 MG/1
40 TABLET, FILM COATED, EXTENDED RELEASE ORAL ONCE
Status: COMPLETED | OUTPATIENT
Start: 2024-02-14 | End: 2024-02-14

## 2024-02-14 RX ORDER — CEPHALEXIN 500 MG/1
500 CAPSULE ORAL 4 TIMES DAILY
Qty: 40 CAPSULE | Refills: 0 | Status: SHIPPED | OUTPATIENT
Start: 2024-02-14 | End: 2024-02-24

## 2024-02-14 RX ORDER — ONDANSETRON 4 MG/1
4 TABLET, ORALLY DISINTEGRATING ORAL 3 TIMES DAILY PRN
Qty: 21 TABLET | Refills: 0 | Status: SHIPPED | OUTPATIENT
Start: 2024-02-14

## 2024-02-14 RX ADMIN — POTASSIUM CHLORIDE 40 MEQ: 750 TABLET, EXTENDED RELEASE ORAL at 01:26

## 2024-02-14 NOTE — ED NOTES
ED Course as of 02/14/24 0110   Tue Feb 13, 2024   2112 ED EKG interpretation:  Rhythm: sinus rhythm. Rate (approx.): 98.  Axis: normal.  ST segment:  No concerning ST elevations or depressions. This EKG was interpreted by Hipolito Watson MD,ED Physician. [JM]   2151 Pulse: 97 [EP]   Wed Feb 14, 2024   0054 Signout received from Anita Munguia.  Patient pending KARTHIK US  In summary: 18-week pregnant patient presenting with abdominal pain.  Urine showing signs concerning for possible urinary tract infection.  Leukocytosis.  Patient has improvement of symptoms with treatment in ER.  Pending right upper quadrant ultrasound.  If ultrasound negative discharged home on antibiotics Keflex.   [ZD]      ED Course User Index  [EP] Anita Munguia PA  [JM] Hipolito Watson MD  [ZD] Guevara Schreiber MD     Discussed the discharge impression and any labs and the results with the patient. Answered any questions and addressed any concerns. Discussed the importance of following up with their primary care provider and/or specialist.  Discussed signs or symptoms that would warrant return back to the ER for further evaluation. The patient is agreeable with discharge.      Vitals:    02/13/24 2245 02/13/24 2300 02/13/24 2315 02/13/24 2330   BP: 109/65  (!) 107/54 (!) 102/54   Pulse: 97 87 85 98   Resp: 22 19 17   Temp:       TempSrc:       SpO2: 99%  99% 99%   Weight:       Height:               Results for orders placed or performed during the hospital encounter of 02/13/24   Urine Culture Hold Sample    Specimen: Urine   Result Value Ref Range    Specimen HOld        Urine on hold in Microbiology dept for 2 days.  If unpreserved urine is submitted, it cannot be used for addtional testing after 24 hours, recollection will be required.   COVID-19, Rapid    Specimen: Nasopharyngeal   Result Value Ref Range    Source Nasopharyngeal      SARS-CoV-2, Rapid Not detected NOTD     Rapid influenza A/B antigens    Specimen: Nasopharyngeal

## 2024-02-14 NOTE — ED TRIAGE NOTES
Patient to Triage via wheelchair with c/o chest pain, right sided flank pain and vomiting while 18 weeks pregnant.    Patient reports that all of these symptoms started this evening.    Patient reports that she is a patient of Dr. Nation, states that this is her 5th pregnancy.    Patient denies any lower pelvic pain and/or vaginal bleeding.

## 2024-02-14 NOTE — ED PROVIDER NOTES
Psychiatric/Behavioral:  Negative for confusion.        Except as noted above the remainder of the review of systems was reviewed and negative.       PAST MEDICAL HISTORY     Past Medical History:   Diagnosis Date    Burning with urination        SURGICAL HISTORY       Past Surgical History:   Procedure Laterality Date     DELIVERY ONLY      DELIVERY   07    UROLOGICAL SURGERY  2022    cystoscopy       CURRENT MEDICATIONS       Previous Medications    HYDROCORTISONE, TOPICAL, (CORTIZONE-10) 1 % GEL    Apply a small amount of medication to the affected area and gently rub in, you can use up to 4 times a day.       ALLERGIES     Patient has no known allergies.    FAMILY HISTORY       Family History   Problem Relation Age of Onset    Migraines Maternal Grandmother     Migraines Maternal Uncle     Cancer Father         lung        SOCIAL HISTORY       Social History     Socioeconomic History    Marital status:    Tobacco Use    Smoking status: Never    Smokeless tobacco: Never   Substance and Sexual Activity    Alcohol use: No    Drug use: No     Social Determinants of Health     Financial Resource Strain: Low Risk  (2023)    Overall Financial Resource Strain (CARDIA)     Difficulty of Paying Living Expenses: Not hard at all         PHYSICAL EXAM    (up to 7 for level 4, 8 or more for level 5)   Vitals:   Vitals:    24   BP: 114/70   Pulse: (!) 109   Resp: 18   Temp: 98.1 °F (36.7 °C)   TempSrc: Oral   SpO2: 99%   Weight: 78.5 kg (173 lb)   Height: 1.6 m (5' 3\")       Physical Exam  Vitals and nursing note reviewed.   Constitutional:       General: She is not in acute distress.     Appearance: Normal appearance. She is not toxic-appearing or diaphoretic.   HENT:      Head: Normocephalic and atraumatic.   Eyes:      Extraocular Movements: Extraocular movements intact.      Conjunctiva/sclera: Conjunctivae normal.   Cardiovascular:      Rate and Rhythm: Normal rate and

## 2024-02-15 LAB
BACTERIA SPEC CULT: NORMAL
CC UR VC: NORMAL
SERVICE CMNT-IMP: NORMAL

## 2024-02-16 LAB
BACTERIA SPEC CULT: NORMAL
SERVICE CMNT-IMP: NORMAL

## 2024-06-25 LAB — GBS, EXTERNAL RESULT: NEGATIVE

## 2024-07-06 ENCOUNTER — HOSPITAL ENCOUNTER (OUTPATIENT)
Facility: HOSPITAL | Age: 39
Discharge: HOME | End: 2024-07-06
Attending: OBSTETRICS & GYNECOLOGY | Admitting: OBSTETRICS & GYNECOLOGY
Payer: COMMERCIAL

## 2024-07-06 VITALS
OXYGEN SATURATION: 98 % | HEART RATE: 77 BPM | DIASTOLIC BLOOD PRESSURE: 67 MMHG | RESPIRATION RATE: 17 BRPM | TEMPERATURE: 98 F | SYSTOLIC BLOOD PRESSURE: 119 MMHG

## 2024-07-06 PROCEDURE — 2580000003 HC RX 258: Performed by: OBSTETRICS & GYNECOLOGY

## 2024-07-06 PROCEDURE — 59025 FETAL NON-STRESS TEST: CPT

## 2024-07-06 PROCEDURE — 99213 OFFICE O/P EST LOW 20 MIN: CPT

## 2024-07-06 PROCEDURE — G0379 DIRECT REFER HOSPITAL OBSERV: HCPCS

## 2024-07-06 PROCEDURE — 94761 N-INVAS EAR/PLS OXIMETRY MLT: CPT

## 2024-07-06 PROCEDURE — G0378 HOSPITAL OBSERVATION PER HR: HCPCS

## 2024-07-06 PROCEDURE — 6370000000 HC RX 637 (ALT 250 FOR IP): Performed by: OBSTETRICS & GYNECOLOGY

## 2024-07-06 RX ORDER — SODIUM CHLORIDE, SODIUM LACTATE, POTASSIUM CHLORIDE, AND CALCIUM CHLORIDE .6; .31; .03; .02 G/100ML; G/100ML; G/100ML; G/100ML
500 INJECTION, SOLUTION INTRAVENOUS ONCE
Status: COMPLETED | OUTPATIENT
Start: 2024-07-06 | End: 2024-07-06

## 2024-07-06 RX ORDER — CALCIUM CARBONATE 500 MG/1
500 TABLET, CHEWABLE ORAL ONCE
Status: COMPLETED | OUTPATIENT
Start: 2024-07-06 | End: 2024-07-06

## 2024-07-06 RX ADMIN — SODIUM CHLORIDE, POTASSIUM CHLORIDE, SODIUM LACTATE AND CALCIUM CHLORIDE 500 ML: 600; 310; 30; 20 INJECTION, SOLUTION INTRAVENOUS at 21:49

## 2024-07-06 RX ADMIN — ANTACID TABLETS 500 MG: 500 TABLET, CHEWABLE ORAL at 22:22

## 2024-07-06 RX ADMIN — SODIUM CHLORIDE, POTASSIUM CHLORIDE, SODIUM LACTATE AND CALCIUM CHLORIDE 500 ML: 600; 310; 30; 20 INJECTION, SOLUTION INTRAVENOUS at 22:22

## 2024-07-06 NOTE — PROGRESS NOTES
1905: Patient ambulating the halls, requesting to wait to check vital signs until 8pm when patient is to be rechecked.     2100: VORB per Dr Mercado- this RN is to perform cervical check. MD- patient may go home at this time.     2107: Patient refusing cervical check at this time. Stating she would like to go home and is comfortable going home at this time. RN to place patient on monitor for NST.    2112: This RN having Dr Thomas review strip. This RN turning patient on her side.     2140: This RN asking MD Thomas if she wants a liter bolus at this time. VORB- fluid bolus at this time. 1 liter then will reassess    2319: DEEDEE Jones CNM seeing patient at this time. CNM providing discharge education at this time. CNM reviewed strip and VORB- patient may go home at this time. NST reactive, [id number]. CNM reviewed. Patient requesting to go home. VORB- patient may be discharged at this time.     2328: This RN providing discharge education at this time. Opportunity for questions and concerns to be addressed was provided. Patient verbalizes understanding and reports no questions at this time.     2341: Patient ambulating off unit at this time with partner.

## 2024-07-06 NOTE — PROGRESS NOTES
1815 Patient arrived on the unit and reports vaginal pressure and decreased fetal movement. Fetal monitor on and tracing baby's heart rate. While RN was at the bedside the patient stated that the baby is moving. RN will update MD of patient's arrival.     1845 Dr Thomas at the bedside assessing the patient. SVE, closed. Plan of care is for the patient to ambulate the felder and recheck cervix in 1 hour. Patient and  agree to the plan and is walking the felder and P.O. hydrating.

## 2024-07-06 NOTE — PROGRESS NOTES
1805 Patient arrived on unit with c/o BLE edema and decreased fetal movement.  Ambulated to room 212 with significant other.

## 2024-07-06 NOTE — H&P
History & Physical    Name: Radha Devine MRN: [id number]  SSN: [id number]    YOB: 1985  Age: 39 y.o.  Sex: female        Subjective:     Estimated Date of Delivery: None noted.  OB History          5    Para   4    Term   4       0    AB   0    Living   4         SAB        IAB        Ectopic        Molar        Multiple        Live Births              Obstetric Comments   FT c-sect  Term  x 4  Last delivery 7 years ago               Ms. Devine is a 40 yo  who presents with pregnancy 38 2/7 wks reporting intermittent vaginal pressure, decreased fetal movement, and worsening edema of her ankles and feet. Reports edema for the past week that has gotten worse today. She reports edema of her hands yesterday that has since resolved and denies facial edema. She denies headache, vision changes, ruq pain, leakage of vaginal fluid, and vaginal bleeding. She has nausea and vomiting of pregnancy which is unchanged from her baseline. Her pregnancy is complicated by previous  section followed by 4 VBACs, and uti. Since arriving to labor and delivery she had felt good fetal movement and reports that her baby has moved at least 6 times thus far. Please see prenatal records for details.    Past Medical History:   Diagnosis Date    Burning with urination     Renal stones     UTI (urinary tract infection)      Past Surgical History:   Procedure Laterality Date     DELIVERY ONLY      DELIVERY   07    UROLOGICAL SURGERY  2022    cystoscopy     Social History     Occupational History    Not on file   Tobacco Use    Smoking status: Never    Smokeless tobacco: Never   Vaping Use    Vaping Use: Never used   Substance and Sexual Activity    Alcohol use: No    Drug use: No    Sexual activity: Not on file     Family History   Problem Relation Age of Onset    Migraines Maternal Grandmother     Migraines Maternal Uncle     Cancer Father         lung       No Known

## 2024-07-07 PROCEDURE — 99213 OFFICE O/P EST LOW 20 MIN: CPT

## 2024-07-07 PROCEDURE — 59025 FETAL NON-STRESS TEST: CPT

## 2024-07-07 NOTE — PROGRESS NOTES
Progress Note     Patient: Radha Devine MRN: [id number]  SSN: [id number]    YOB: 1985  Age: 39 y.o.  Sex: female        Subjective:   Patient evaluated and is requesting discharge. Reports + fetal movement and that contractions have stopped.  Objective:   Blood pressure 119/67, pulse 77, temperature 98 °F (36.7 °C), temperature source Oral, resp. rate 17, SpO2 98 %.    Sterile Vaginal Exam: declines cervical exam,  Membranes:  intact  EFM:  - Fetal Heart Rate: reactive     - Uterine Activity: none, irritability        Assessment:    SIUP @ 38/2  False labor   Category 1 fetal heart rate tracing   Plan:   False labor   Discharge  Labor precautions reviewed  Has appt in office this week   Signed By:  BING Gillis CNM      2024 11:19 PM

## 2024-07-15 ENCOUNTER — ANESTHESIA (OUTPATIENT)
Facility: HOSPITAL | Age: 39
End: 2024-07-15
Payer: COMMERCIAL

## 2024-07-15 ENCOUNTER — HOSPITAL ENCOUNTER (INPATIENT)
Facility: HOSPITAL | Age: 39
LOS: 2 days | Discharge: HOME | End: 2024-07-17
Attending: STUDENT IN AN ORGANIZED HEALTH CARE EDUCATION/TRAINING PROGRAM | Admitting: STUDENT IN AN ORGANIZED HEALTH CARE EDUCATION/TRAINING PROGRAM
Payer: COMMERCIAL

## 2024-07-15 ENCOUNTER — ANESTHESIA EVENT (OUTPATIENT)
Facility: HOSPITAL | Age: 39
End: 2024-07-15
Payer: COMMERCIAL

## 2024-07-15 PROBLEM — Z3A.40 40 WEEKS GESTATION OF PREGNANCY: Status: ACTIVE | Noted: 2024-07-15

## 2024-07-15 LAB
ABO + RH BLD: NORMAL
BASOPHILS # BLD: 0.1 K/UL (ref 0–0.1)
BASOPHILS NFR BLD: 1 % (ref 0–1)
BLOOD GROUP ANTIBODIES SERPL: NORMAL
DIFFERENTIAL METHOD BLD: ABNORMAL
EOSINOPHIL # BLD: 0.1 K/UL (ref 0–0.4)
EOSINOPHIL NFR BLD: 0 % (ref 0–7)
ERYTHROCYTE [DISTWIDTH] IN BLOOD BY AUTOMATED COUNT: 12.9 % (ref 11.5–14.5)
HCT VFR BLD AUTO: 31.2 % (ref 35–47)
HGB BLD-MCNC: 10.2 G/DL (ref 11.5–16)
IMM GRANULOCYTES # BLD AUTO: 0.1 K/UL (ref 0–0.04)
IMM GRANULOCYTES NFR BLD AUTO: 1 % (ref 0–0.5)
LYMPHOCYTES # BLD: 1.5 K/UL (ref 0.8–3.5)
LYMPHOCYTES NFR BLD: 13 % (ref 12–49)
MCH RBC QN AUTO: 28.4 PG (ref 26–34)
MCHC RBC AUTO-ENTMCNC: 32.7 G/DL (ref 30–36.5)
MCV RBC AUTO: 86.9 FL (ref 80–99)
MONOCYTES # BLD: 0.9 K/UL (ref 0–1)
MONOCYTES NFR BLD: 8 % (ref 5–13)
NEUTS SEG # BLD: 8.9 K/UL (ref 1.8–8)
NEUTS SEG NFR BLD: 77 % (ref 32–75)
NRBC # BLD: 0 K/UL (ref 0–0.01)
NRBC BLD-RTO: 0 PER 100 WBC
PLATELET # BLD AUTO: 213 K/UL (ref 150–400)
PMV BLD AUTO: 12.4 FL (ref 8.9–12.9)
RBC # BLD AUTO: 3.59 M/UL (ref 3.8–5.2)
SPECIMEN EXP DATE BLD: NORMAL
WBC # BLD AUTO: 11.5 K/UL (ref 3.6–11)

## 2024-07-15 PROCEDURE — 86900 BLOOD TYPING SEROLOGIC ABO: CPT

## 2024-07-15 PROCEDURE — 6370000000 HC RX 637 (ALT 250 FOR IP): Performed by: STUDENT IN AN ORGANIZED HEALTH CARE EDUCATION/TRAINING PROGRAM

## 2024-07-15 PROCEDURE — 10907ZC DRAINAGE OF AMNIOTIC FLUID, THERAPEUTIC FROM PRODUCTS OF CONCEPTION, VIA NATURAL OR ARTIFICIAL OPENING: ICD-10-PCS | Performed by: STUDENT IN AN ORGANIZED HEALTH CARE EDUCATION/TRAINING PROGRAM

## 2024-07-15 PROCEDURE — G0378 HOSPITAL OBSERVATION PER HR: HCPCS

## 2024-07-15 PROCEDURE — 86901 BLOOD TYPING SEROLOGIC RH(D): CPT

## 2024-07-15 PROCEDURE — 1120000000 HC RM PRIVATE OB

## 2024-07-15 PROCEDURE — G0379 DIRECT REFER HOSPITAL OBSERV: HCPCS

## 2024-07-15 PROCEDURE — 3700000025 EPIDURAL BLOCK: Performed by: ANESTHESIOLOGY

## 2024-07-15 PROCEDURE — 99213 OFFICE O/P EST LOW 20 MIN: CPT

## 2024-07-15 PROCEDURE — 0KQM0ZZ REPAIR PERINEUM MUSCLE, OPEN APPROACH: ICD-10-PCS | Performed by: STUDENT IN AN ORGANIZED HEALTH CARE EDUCATION/TRAINING PROGRAM

## 2024-07-15 PROCEDURE — 94761 N-INVAS EAR/PLS OXIMETRY MLT: CPT

## 2024-07-15 PROCEDURE — 36415 COLL VENOUS BLD VENIPUNCTURE: CPT

## 2024-07-15 PROCEDURE — 6370000000 HC RX 637 (ALT 250 FOR IP)

## 2024-07-15 PROCEDURE — 2580000003 HC RX 258: Performed by: STUDENT IN AN ORGANIZED HEALTH CARE EDUCATION/TRAINING PROGRAM

## 2024-07-15 PROCEDURE — 86850 RBC ANTIBODY SCREEN: CPT

## 2024-07-15 PROCEDURE — 3700000156 HC EPIDURAL ANESTHESIA: Performed by: ANESTHESIOLOGY

## 2024-07-15 PROCEDURE — 7210000100 HC LABOR FEE PER 1 HR: Performed by: STUDENT IN AN ORGANIZED HEALTH CARE EDUCATION/TRAINING PROGRAM

## 2024-07-15 PROCEDURE — 00HU33Z INSERTION OF INFUSION DEVICE INTO SPINAL CANAL, PERCUTANEOUS APPROACH: ICD-10-PCS | Performed by: ANESTHESIOLOGY

## 2024-07-15 PROCEDURE — 59025 FETAL NON-STRESS TEST: CPT

## 2024-07-15 PROCEDURE — 4A1HXCZ MONITORING OF PRODUCTS OF CONCEPTION, CARDIAC RATE, EXTERNAL APPROACH: ICD-10-PCS | Performed by: STUDENT IN AN ORGANIZED HEALTH CARE EDUCATION/TRAINING PROGRAM

## 2024-07-15 PROCEDURE — 2500000003 HC RX 250 WO HCPCS: Performed by: ANESTHESIOLOGY

## 2024-07-15 PROCEDURE — 85025 COMPLETE CBC W/AUTO DIFF WBC: CPT

## 2024-07-15 PROCEDURE — 7220000101 HC DELIVERY VAGINAL/SINGLE: Performed by: STUDENT IN AN ORGANIZED HEALTH CARE EDUCATION/TRAINING PROGRAM

## 2024-07-15 PROCEDURE — 86592 SYPHILIS TEST NON-TREP QUAL: CPT

## 2024-07-15 PROCEDURE — 2700000000 HC OXYGEN THERAPY PER DAY

## 2024-07-15 PROCEDURE — 6360000002 HC RX W HCPCS: Performed by: ANESTHESIOLOGY

## 2024-07-15 RX ORDER — SODIUM CHLORIDE 9 MG/ML
25 INJECTION, SOLUTION INTRAVENOUS PRN
Status: DISCONTINUED | OUTPATIENT
Start: 2024-07-15 | End: 2024-07-17 | Stop reason: HOSPADM

## 2024-07-15 RX ORDER — SODIUM CHLORIDE 0.9 % (FLUSH) 0.9 %
5-40 SYRINGE (ML) INJECTION EVERY 12 HOURS SCHEDULED
Status: DISCONTINUED | OUTPATIENT
Start: 2024-07-15 | End: 2024-07-16

## 2024-07-15 RX ORDER — SODIUM CHLORIDE, SODIUM LACTATE, POTASSIUM CHLORIDE, CALCIUM CHLORIDE 600; 310; 30; 20 MG/100ML; MG/100ML; MG/100ML; MG/100ML
INJECTION, SOLUTION INTRAVENOUS CONTINUOUS
Status: DISCONTINUED | OUTPATIENT
Start: 2024-07-15 | End: 2024-07-17 | Stop reason: HOSPADM

## 2024-07-15 RX ORDER — POLYETHYLENE GLYCOL 3350 17 G/17G
17 POWDER, FOR SOLUTION ORAL DAILY
Status: DISCONTINUED | OUTPATIENT
Start: 2024-07-15 | End: 2024-07-17 | Stop reason: HOSPADM

## 2024-07-15 RX ORDER — FAMOTIDINE 20 MG/1
20 TABLET, FILM COATED ORAL 2 TIMES DAILY
Status: DISCONTINUED | OUTPATIENT
Start: 2024-07-15 | End: 2024-07-17 | Stop reason: HOSPADM

## 2024-07-15 RX ORDER — DOCUSATE SODIUM 100 MG/1
100 CAPSULE, LIQUID FILLED ORAL 2 TIMES DAILY
Status: DISCONTINUED | OUTPATIENT
Start: 2024-07-15 | End: 2024-07-15 | Stop reason: SDUPTHER

## 2024-07-15 RX ORDER — BUPIVACAINE HYDROCHLORIDE 2.5 MG/ML
INJECTION, SOLUTION EPIDURAL; INFILTRATION; INTRACAUDAL PRN
Status: DISCONTINUED | OUTPATIENT
Start: 2024-07-15 | End: 2024-07-15 | Stop reason: SDUPTHER

## 2024-07-15 RX ORDER — SODIUM CHLORIDE 9 MG/ML
INJECTION, SOLUTION INTRAVENOUS PRN
Status: DISCONTINUED | OUTPATIENT
Start: 2024-07-15 | End: 2024-07-17 | Stop reason: HOSPADM

## 2024-07-15 RX ORDER — ONDANSETRON 4 MG/1
4 TABLET, ORALLY DISINTEGRATING ORAL EVERY 6 HOURS PRN
Status: DISCONTINUED | OUTPATIENT
Start: 2024-07-15 | End: 2024-07-17 | Stop reason: HOSPADM

## 2024-07-15 RX ORDER — MISOPROSTOL 200 UG/1
TABLET ORAL
Status: COMPLETED
Start: 2024-07-15 | End: 2024-07-15

## 2024-07-15 RX ORDER — MISOPROSTOL 200 UG/1
800 TABLET ORAL ONCE
Status: COMPLETED | OUTPATIENT
Start: 2024-07-15 | End: 2024-07-15

## 2024-07-15 RX ORDER — TERBUTALINE SULFATE 1 MG/ML
0.25 INJECTION, SOLUTION SUBCUTANEOUS
Status: ACTIVE | OUTPATIENT
Start: 2024-07-15 | End: 2024-07-16

## 2024-07-15 RX ORDER — SODIUM CHLORIDE, SODIUM LACTATE, POTASSIUM CHLORIDE, AND CALCIUM CHLORIDE .6; .31; .03; .02 G/100ML; G/100ML; G/100ML; G/100ML
1000 INJECTION, SOLUTION INTRAVENOUS PRN
Status: DISCONTINUED | OUTPATIENT
Start: 2024-07-15 | End: 2024-07-17 | Stop reason: HOSPADM

## 2024-07-15 RX ORDER — METHYLERGONOVINE MALEATE 0.2 MG/ML
200 INJECTION INTRAVENOUS PRN
Status: DISCONTINUED | OUTPATIENT
Start: 2024-07-15 | End: 2024-07-17 | Stop reason: HOSPADM

## 2024-07-15 RX ORDER — SODIUM CHLORIDE 0.9 % (FLUSH) 0.9 %
5-40 SYRINGE (ML) INJECTION PRN
Status: DISCONTINUED | OUTPATIENT
Start: 2024-07-15 | End: 2024-07-16

## 2024-07-15 RX ORDER — ONDANSETRON 2 MG/ML
4 INJECTION INTRAMUSCULAR; INTRAVENOUS EVERY 6 HOURS PRN
Status: DISCONTINUED | OUTPATIENT
Start: 2024-07-15 | End: 2024-07-17 | Stop reason: HOSPADM

## 2024-07-15 RX ORDER — ACETAMINOPHEN 325 MG/1
650 TABLET ORAL EVERY 4 HOURS PRN
Status: DISCONTINUED | OUTPATIENT
Start: 2024-07-15 | End: 2024-07-17 | Stop reason: HOSPADM

## 2024-07-15 RX ORDER — TRANEXAMIC ACID 10 MG/ML
1000 INJECTION, SOLUTION INTRAVENOUS
Status: ACTIVE | OUTPATIENT
Start: 2024-07-15 | End: 2024-07-16

## 2024-07-15 RX ORDER — MISOPROSTOL 200 UG/1
400 TABLET ORAL PRN
Status: DISCONTINUED | OUTPATIENT
Start: 2024-07-15 | End: 2024-07-17 | Stop reason: HOSPADM

## 2024-07-15 RX ORDER — FENTANYL/BUPIVACAINE/NS/PF 2-1250MCG
10 PLASTIC BAG, INJECTION (ML) INJECTION CONTINUOUS
Status: DISCONTINUED | OUTPATIENT
Start: 2024-07-15 | End: 2024-07-17 | Stop reason: HOSPADM

## 2024-07-15 RX ORDER — BENZOCAINE/MENTHOL 6 MG-10 MG
LOZENGE MUCOUS MEMBRANE 2 TIMES DAILY
Status: DISCONTINUED | OUTPATIENT
Start: 2024-07-15 | End: 2024-07-17 | Stop reason: HOSPADM

## 2024-07-15 RX ORDER — ACETAMINOPHEN 500 MG
1000 TABLET ORAL EVERY 8 HOURS SCHEDULED
Status: DISCONTINUED | OUTPATIENT
Start: 2024-07-15 | End: 2024-07-17 | Stop reason: HOSPADM

## 2024-07-15 RX ORDER — DOCUSATE SODIUM 100 MG/1
100 CAPSULE, LIQUID FILLED ORAL 2 TIMES DAILY
Status: DISCONTINUED | OUTPATIENT
Start: 2024-07-15 | End: 2024-07-17 | Stop reason: HOSPADM

## 2024-07-15 RX ORDER — NALOXONE HYDROCHLORIDE 0.4 MG/ML
INJECTION, SOLUTION INTRAMUSCULAR; INTRAVENOUS; SUBCUTANEOUS PRN
Status: DISCONTINUED | OUTPATIENT
Start: 2024-07-15 | End: 2024-07-17 | Stop reason: HOSPADM

## 2024-07-15 RX ORDER — LANOLIN/MINERAL OIL
LOTION (ML) TOPICAL PRN
Status: DISCONTINUED | OUTPATIENT
Start: 2024-07-15 | End: 2024-07-17 | Stop reason: HOSPADM

## 2024-07-15 RX ORDER — CARBOPROST TROMETHAMINE 250 UG/ML
250 INJECTION, SOLUTION INTRAMUSCULAR PRN
Status: DISCONTINUED | OUTPATIENT
Start: 2024-07-15 | End: 2024-07-17 | Stop reason: HOSPADM

## 2024-07-15 RX ORDER — SODIUM CHLORIDE, SODIUM LACTATE, POTASSIUM CHLORIDE, AND CALCIUM CHLORIDE .6; .31; .03; .02 G/100ML; G/100ML; G/100ML; G/100ML
500 INJECTION, SOLUTION INTRAVENOUS PRN
Status: DISCONTINUED | OUTPATIENT
Start: 2024-07-15 | End: 2024-07-17 | Stop reason: HOSPADM

## 2024-07-15 RX ORDER — SIMETHICONE 80 MG
80 TABLET,CHEWABLE ORAL EVERY 6 HOURS PRN
Status: DISCONTINUED | OUTPATIENT
Start: 2024-07-15 | End: 2024-07-17 | Stop reason: HOSPADM

## 2024-07-15 RX ORDER — IBUPROFEN 800 MG/1
800 TABLET ORAL EVERY 8 HOURS SCHEDULED
Status: DISCONTINUED | OUTPATIENT
Start: 2024-07-15 | End: 2024-07-17 | Stop reason: HOSPADM

## 2024-07-15 RX ADMIN — ACETAMINOPHEN 1000 MG: 500 TABLET ORAL at 18:24

## 2024-07-15 RX ADMIN — SODIUM CHLORIDE, POTASSIUM CHLORIDE, SODIUM LACTATE AND CALCIUM CHLORIDE: 600; 310; 30; 20 INJECTION, SOLUTION INTRAVENOUS at 12:07

## 2024-07-15 RX ADMIN — SODIUM CHLORIDE, POTASSIUM CHLORIDE, SODIUM LACTATE AND CALCIUM CHLORIDE 1000 ML: 600; 310; 30; 20 INJECTION, SOLUTION INTRAVENOUS at 10:17

## 2024-07-15 RX ADMIN — BUPIVACAINE HYDROCHLORIDE 10 ML: 2.5 INJECTION, SOLUTION EPIDURAL; INFILTRATION; INTRACAUDAL; PERINEURAL at 11:02

## 2024-07-15 RX ADMIN — Medication 10 ML/HR: at 11:33

## 2024-07-15 RX ADMIN — IBUPROFEN 800 MG: 800 TABLET, FILM COATED ORAL at 19:41

## 2024-07-15 RX ADMIN — Medication 166.7 ML: at 15:16

## 2024-07-15 RX ADMIN — MISOPROSTOL 800 MCG: 200 TABLET ORAL at 15:36

## 2024-07-15 RX ADMIN — SODIUM CHLORIDE, POTASSIUM CHLORIDE, SODIUM LACTATE AND CALCIUM CHLORIDE 1000 ML: 600; 310; 30; 20 INJECTION, SOLUTION INTRAVENOUS at 09:19

## 2024-07-15 RX ADMIN — DOCUSATE SODIUM 100 MG: 100 CAPSULE, LIQUID FILLED ORAL at 19:43

## 2024-07-15 ASSESSMENT — PAIN - FUNCTIONAL ASSESSMENT: PAIN_FUNCTIONAL_ASSESSMENT: ACTIVITIES ARE NOT PREVENTED

## 2024-07-15 ASSESSMENT — PAIN DESCRIPTION - LOCATION: LOCATION: ABDOMEN

## 2024-07-15 ASSESSMENT — PAIN SCALES - GENERAL: PAINLEVEL_OUTOF10: 4

## 2024-07-15 ASSESSMENT — PAIN DESCRIPTION - ORIENTATION: ORIENTATION: LOWER

## 2024-07-15 ASSESSMENT — PAIN DESCRIPTION - DESCRIPTORS: DESCRIPTORS: DISCOMFORT;CRAMPING

## 2024-07-15 NOTE — PROGRESS NOTES
1450 SBAR rec'd from ROSSANA Gonsales RN.  Patient getting ready to start pushing.  Dr. FELICIA Nation at bedside.    1750 Patient transferred to MIU.  Bedside SBAR given to ISMAEL Pate RN.       Patient Education     Computed Tomography (CT)     During the test, relax and remain as still as you can.     Computed tomography (CT) is a test that combines X-rays and computer scans. The result is a detailed picture that can show problems with soft tissues, such as the lining of your sinuses, organs, such as your kidneys or lungs, blood vessels, and bones.  Tell the technologist   Be sure to tell the technologist if you:  · Have allergies or kidney problems  · Take diabetes medicine  · Are pregnant or think you may be  · Ate or drank anything before the test   Before your test  · Be sure to tell your healthcare provider if you have ever had a reaction to contrast material (\"X-ray dye\"). If you have had a reaction, you may need to take medicine before your scan, so be sure to tell your provider ahead of time.   · Be sure to mention the medicines you take. Ask if it's OK to take them before the test.   · Follow any directions you’re given for not eating or drinking before the procedure. Your provider will give you instructions if required. You may be required to drink contrast by mouth before arriving for the study depending on the type of exam you are having. Your provider or the imaging site will provide this for you.  · The length of the procedure may vary, depending on your condition and your provider's practices.  · Arrive on time to check in.  · When you arrive, you may be asked to change into a hospital gown. Remove all metal near the part of your body that will be scanned, including jewelry, eyeglasses, and dentures. Women may need to remove any bra that has metal underwire.   During your test  · You may be given contrast through an intravenous (IV) line or by mouth.  · You will lie on a table. The table slides into the CT scanner.  · The technologist will ask you to hold your breath for a few seconds during your scan.  After your test  · You can go back to your normal diet and activities right away. Any  contrast will pass naturally through your body within a day.  · Before leaving, you may need to wait briefly while your images are being reviewed. Your healthcare provider will discuss the test results with you during a follow-up appointment or over the phone.  · Your next appointment is:__________________

## 2024-07-15 NOTE — ANESTHESIA PROCEDURE NOTES
Epidural Block    Patient location during procedure: OB  Start time: 7/15/2024 10:54 AM  End time: 7/15/2024 11:02 AM  Reason for block: procedure for pain and labor epidural  Staffing  Performed: anesthesiologist   Anesthesiologist: Fannie Robles MD  Performed by: Fannie Robles MD  Authorized by: Fannie Robles MD    Epidural  Patient position: sitting  Prep: Betadine  Patient monitoring: continuous pulse ox and frequent blood pressure checks  Approach: midline  Location: L3-4  Injection technique: TEMITOPE air  Provider prep: sterile gloves and mask  Needle  Needle type: Tuohy   Needle gauge: 17 G  Needle length: 3.5 in  Needle insertion depth: 6 cm  Catheter type: multi-orifice  Catheter size: 20 G  Catheter at skin depth: 10 cm  Test dose: negativeCatheter Secured: tegaderm and tape  Assessment  Hemodynamics: stable  Attempts: 1  Outcomes: uncomplicated  Preanesthetic Checklist  Completed: patient identified, IV checked, site marked, risks and benefits discussed, surgical/procedural consents, equipment checked, pre-op evaluation, timeout performed, anesthesia consent given, oxygen available, monitors applied/VS acknowledged, fire risk safety assessment completed and verbalized and blood product R/B/A discussed and consented

## 2024-07-15 NOTE — H&P
History and Physical    Patient: Radha Devine MRN: [id number]  SSN: [id number]    YOB: 1985  Age: 39 y.o.  Sex: female      Subjective:      Radha Devine is a 39 y.o. female who is a  @ 40w0d here with reports of uterine contractions that started at 0300 that have increased in frequency, intensity and severity. Endorses +FM. Denies VB/LOF. Also denies HA/Vision changes/ Epigastric pain/New swelling.  Pregnancy course complicated by hx of prior  x 16 yrs ago and AMA.  GBS is negative.  See prenatal record for details.    Past Medical History:   Diagnosis Date    Burning with urination     Renal stones     UTI (urinary tract infection)      Past Surgical History:   Procedure Laterality Date    ABDOMEN SURGERY       DELIVERY ONLY      DELIVERY   07    UROLOGICAL SURGERY  2022    cystoscopy      Family History   Problem Relation Age of Onset    Migraines Maternal Grandmother     Migraines Maternal Uncle     Cancer Father         lung     Social History     Tobacco Use    Smoking status: Never    Smokeless tobacco: Never   Substance Use Topics    Alcohol use: No      Prior to Admission medications    Medication Sig Start Date End Date Taking? Authorizing Provider   [id number]-[id number] (PRENA1 PO) Take by mouth   Yes Provider, MD Todd   ondansetron (ZOFRAN-ODT) 4 MG disintegrating tablet Take 1 tablet by mouth 3 times daily as needed for Nausea or Vomiting 24   Anita Munguia PA   HYDROCORTISONE, TOPICAL, (CORTIZONE-10) 1 % GEL Apply a small amount of medication to the affected area and gently rub in, you can use up to 4 times a day.  Patient not taking: Reported on 7/15/2024 5/18/23   Sepideh Tracy MD        No Known Allergies    Review of Systems:  A comprehensive review of systems was negative except for that written in the History of Present Illness.    Objective:     Vitals:    07/15/24 0525   BP: 124/71   Pulse: 61   Resp: 16   Temp:

## 2024-07-15 NOTE — DISCHARGE SUMMARY
Obstetrical Discharge Summary     Name: Radha Devine MRN: [id number]  SSN: [id number]    YOB: 1985  Age: 39 y.o.  Sex: female      Admit Date: 7/15/2024    Discharge Date: 7/17/2024     Attending Physician:  Lisbeth Nation MD     Delivering Physician:  Lisbeth Nation MD     * Admission Diagnoses:   IUP @ 40w0d        * Discharge Diagnoses:   Delivery of a viable infant via spontaneous vaginal delivery by Lisbeth Natino MD on 7/15/2024.  Apgars were 8 and 9.    Same as above         Additional Diagnoses:      No results found for: \"RUBELLAEXT\", \"GRBSEXT\"   Immunization History   Administered Date(s) Administered    MMR, PRIORIX, M-M-R II, (age 12m+), SC, 0.5mL 07/25/2015       * Procedures:   Vaginal delivery          * Discharge Condition: good    * Hospital Course: Normal hospital course following the delivery.    * Disposition: Home    Discharge Medications:      Medication List        ASK your doctor about these medications      Cortizone-10 1 % Gel  Generic drug: HYDROCORTISONE (TOPICAL)  Apply a small amount of medication to the affected area and gently rub in, you can use up to 4 times a day.     ondansetron 4 MG disintegrating tablet  Commonly known as: ZOFRAN-ODT  Take 1 tablet by mouth 3 times daily as needed for Nausea or Vomiting     PRENA1 PO              * Follow-up Care/Patient Instructions:  Activity: Activity as tolerated  Diet: Regular Diet  Wound Care: As directed  Followup 4-6 weeks for PP check        Signed By:  Lisbeth Nation MD  [facility]     July 15, 2024

## 2024-07-15 NOTE — PROGRESS NOTES
0705-Bedside shift change report given to ROSSANA Gonsales RN (oncoming nurse) by HONEY Santana RN (offgoing nurse). Report included the following information Nurse Handoff Report, Intake/Output, MAR, Recent Results, and Med Rec Status.       0710-Pt back to bed from ambulating in hallway    0737-This RN called Dr. Nation-notified of pt, orders received to recheck cervix, if unchanged, pt may be discharged to follow up in office for later appointment. Pt may stay as she lives an hour away but MD will be in surgery for about three hours. RN verbalizes understanding. No further orders received.    0800-Dr. Roche at bedside to see pt-discusses option to keep pt and AROM to help labor progress. Pt wants to discuss with SO for POC and let MD know soon. MD verbalizes understanding.    0815-Dr. Roche notified pt decides to stay and have AROM. MD states she will return in about 30 mins to AROM.    0844-Dr. Roche at bedside, VE 4/70/-2, AROM, no fluid returned    0915-Pt requests epidural, IV fluid bolus started    1015-Dr. Robles notified pt ready for epidural    1032-Pt up to the bathroom    1053-Dr. Robles at bedside    1054-TO    1057-Epidural catheter placed    1057-Test dose    1102-Loading dose    1118-Pt c/o feeling pain on right side but complete relief on left side, RN will notify Dr. Robles    1120-Dr. Robles at bedside to dose epidural while pt laying on right side    1140-Billings catheter placed, pt reports not really feeling the catheter be placed but continues to report pain in RLQ of her abdomen    1143-Pt repositioned to far right side, peanut ball between knees    1250-Pt repositioned to far left side, pt c/o of \"too much pain in right abdomen in this position\"     1252-Pt repositioned to high fowlers per request    1322-Dr. Nation at bedside, VE 6/90/-1. Pt repositioned to semi fowlers per request. Pt continues to report RLQ abdominal pain. MD discusses replacement of epidural but pt

## 2024-07-15 NOTE — ANESTHESIA PRE PROCEDURE
Department of Anesthesiology  Preprocedure Note       Name:  Radha Devine   Age:  39 y.o.  :  1985                                          MRN:  [id number]         Date:  7/15/2024      Surgeon: * Surgery not found *    Procedure:     Medications prior to admission:   Prior to Admission medications    Medication Sig Start Date End Date Taking? Authorizing Provider   [id number]-[id number] (PRENA1 PO) Take by mouth   Yes Provider, MD Todd   ondansetron (ZOFRAN-ODT) 4 MG disintegrating tablet Take 1 tablet by mouth 3 times daily as needed for Nausea or Vomiting 24   Anita Munguia PA   HYDROCORTISONE, TOPICAL, (CORTIZONE-10) 1 % GEL Apply a small amount of medication to the affected area and gently rub in, you can use up to 4 times a day.  Patient not taking: Reported on 7/15/2024 5/18/23   Sepideh Tracy MD       Current medications:    Current Facility-Administered Medications   Medication Dose Route Frequency Provider Last Rate Last Admin   • terbutaline (BRETHINE) injection 0.25 mg  0.25 mg SubCUTAneous Once PRN Lana Roche, DO       • lactated ringers IV soln infusion   IntraVENous Continuous Lana Roche, DO       • lactated ringers bolus 500 mL  500 mL IntraVENous PRN Lana Roche DO        Or   • lactated ringers bolus 1,000 mL  1,000 mL IntraVENous PRN Lana Roche .9 mL/hr at 07/15/24 1017 1,000 mL at 07/15/24 1017   • sodium chloride flush 0.9 % injection 5-40 mL  5-40 mL IntraVENous 2 times per day Lana Roche, DO       • sodium chloride flush 0.9 % injection 5-40 mL  5-40 mL IntraVENous PRN Lana Roche DO       • 0.9 % sodium chloride infusion  25 mL IntraVENous PRN Lana Roche, DO       • ondansetron (ZOFRAN) injection 4 mg  4 mg IntraVENous Q6H PRN Lana Roche, DO        Or   • ondansetron (ZOFRAN-ODT) disintegrating tablet 4 mg  4 mg Oral Q6H PRN Ciaburri, Lana M, DO       • oxytocin (PITOCIN) 30 units

## 2024-07-15 NOTE — PLAN OF CARE
Problem: Pain  Goal: Verbalizes/displays adequate comfort level or baseline comfort level  Outcome: Progressing     Problem: Vaginal Birth or  Section  Goal: Fetal and maternal status remain reassuring during the birth process  Description:  Birth OB-Pregnancy care plan goal which identifies if the fetal and maternal status remain reassuring during the birth process  Outcome: Progressing     Problem: Infection - Adult  Goal: Absence of infection at discharge  Outcome: Progressing  Goal: Absence of infection during hospitalization  Outcome: Progressing  Goal: Absence of fever/infection during anticipated neutropenic period  Outcome: Progressing     Problem: Safety - Adult  Goal: Free from fall injury  Outcome: Progressing

## 2024-07-15 NOTE — PROGRESS NOTES
Labor Progress Note  Patient seen, fetal heart rate and contraction pattern evaluated at 0830. Feeling CTXs become more intense since arrival. Lives >1hr away and wants to stay for augmentation of labor.     Physical Exam:  Vitals:   Vitals:    07/15/24 0525 07/15/24 0716   BP: 124/71 126/71   Pulse: 61 69   Resp: 16 16   Temp: 97.4 °F (36.3 °C) 97.6 °F (36.4 °C)   TempSrc: Oral Oral   SpO2: 100% 98%         Cervical Exam  was  examined   4 cm dilated    70% effaced    -2 station    Presenting Part: cephalic  AROM -scant amount of clear fluid    Uterine Activity:: Frequency: Every every 4-6 minutes  Fetal Heart Rate: Reactive  Baseline: 145 per minute  Variability: moderate  Accelerations: yes  Decelerations: none  Pitocin: N/A    Assessment/Plan:  Ms. Devine is admitted with pregnancy at 40w0d for early labor, desires augmentation as she lives >1hr away and contractions getting stronger and more painful        H/o C/S x 1 16yrs ago -followed by  x 3 (last delivery 7yrs ago)  Desires TOLAC/   GBS negative     AROM at 0840   May have epidural when desired     Lana Roche DO  7/15/2024  7:54 AM

## 2024-07-15 NOTE — L&D DELIVERY NOTE
Delivery Providers    Delivering clinician: Lisbeth Nation MD     Provider Role    Lisbeth Nation MD Obstetrician    Betty Chiu, ROBERTO Primary Nurse    Freya Lehman RN Nursery Nurse    Duty, ROBERTO Umaña Charge Nurse    Latia Pillai [facility]              [place] Assessment    Living Status: Living        Skin Color:   Heart Rate:   Reflex Irritability:   Muscle Tone:   Respiratory Effort:   Total:            1 Minute:         5 Minute:                                        Apgars Assigned By: JESSICA LEHMAN RN              [place] Measurements               Skin to Skin      Skin to Skin Initiation Date/Time: 7/15/24 15:14:00 EDT     Skin to Skin With: Mother                    Called to LDR, patient was found to be c/c/+2. Patient pushed effectively and fetal head was delivered over intact perineum. The anterior shoulder followed by the posterior shoulder and body were subsequently delivered. The infant was placed on maternal abdomen.  The cord was then clamped and cut after 1 minute of pulsation. Cord blood was taken.  The placenta delivered spontaneously, intact, with 3VC.  Pitocin was added to the IVF and the fundus was firm to palpation.  The vagina, cervix and perineum were examined and second degree laceration and bilateral sulcal lacerations were found. Brisk bleeding was noted from both sulcal lacerations which were oversewn with multiple running locking sutures of 3-0 Vicryl.   mL.  800mcg of rectal cytotec was placed for uterine tone No complications.  Mom and baby doing well.  Dr. Nation delivering.     Lisbeth Nation MD  [facility]

## 2024-07-15 NOTE — PROGRESS NOTES
0515  patient arrives to L&D reporting painful contractions that started at 0300, now every 3-4 minutes apart.  Patient denies rupture.  Patient is requesting to be checked.  0528  SVE  2.5/80/-3  JOSE Bass [facility] notified.  0546  JOSE Bass at bedside.  Patient will walk around and get re-checked in 2 hours.  0600  patient is ambulating in the felder.

## 2024-07-16 LAB — RPR SER QL: NONREACTIVE

## 2024-07-16 PROCEDURE — 1120000000 HC RM PRIVATE OB

## 2024-07-16 PROCEDURE — 6370000000 HC RX 637 (ALT 250 FOR IP): Performed by: STUDENT IN AN ORGANIZED HEALTH CARE EDUCATION/TRAINING PROGRAM

## 2024-07-16 PROCEDURE — 94761 N-INVAS EAR/PLS OXIMETRY MLT: CPT

## 2024-07-16 RX ADMIN — DOCUSATE SODIUM 100 MG: 100 CAPSULE, LIQUID FILLED ORAL at 09:49

## 2024-07-16 RX ADMIN — IBUPROFEN 800 MG: 800 TABLET, FILM COATED ORAL at 06:20

## 2024-07-16 RX ADMIN — HYDROCORTISONE: 0.01 CREAM TOPICAL at 09:49

## 2024-07-16 RX ADMIN — IBUPROFEN 800 MG: 800 TABLET, FILM COATED ORAL at 22:28

## 2024-07-16 RX ADMIN — ACETAMINOPHEN 1000 MG: 500 TABLET ORAL at 09:48

## 2024-07-16 RX ADMIN — ACETAMINOPHEN 1000 MG: 500 TABLET ORAL at 17:32

## 2024-07-16 RX ADMIN — DOCUSATE SODIUM 100 MG: 100 CAPSULE, LIQUID FILLED ORAL at 19:57

## 2024-07-16 RX ADMIN — IBUPROFEN 800 MG: 800 TABLET, FILM COATED ORAL at 14:42

## 2024-07-16 RX ADMIN — HYDROCORTISONE: 0.01 CREAM TOPICAL at 19:59

## 2024-07-16 RX ADMIN — ACETAMINOPHEN 1000 MG: 500 TABLET ORAL at 02:04

## 2024-07-16 ASSESSMENT — PAIN - FUNCTIONAL ASSESSMENT
PAIN_FUNCTIONAL_ASSESSMENT: ACTIVITIES ARE NOT PREVENTED
PAIN_FUNCTIONAL_ASSESSMENT: ACTIVITIES ARE NOT PREVENTED

## 2024-07-16 ASSESSMENT — PAIN DESCRIPTION - ORIENTATION
ORIENTATION: LOWER
ORIENTATION: LOWER
ORIENTATION: LOWER;MID

## 2024-07-16 ASSESSMENT — PAIN DESCRIPTION - DESCRIPTORS
DESCRIPTORS: DISCOMFORT;CRAMPING
DESCRIPTORS: DISCOMFORT;CRAMPING
DESCRIPTORS: ACHING;SORE

## 2024-07-16 ASSESSMENT — PAIN DESCRIPTION - LOCATION
LOCATION: ABDOMEN;PERINEUM
LOCATION: ABDOMEN
LOCATION: ABDOMEN

## 2024-07-16 ASSESSMENT — PAIN SCALES - GENERAL
PAINLEVEL_OUTOF10: 5
PAINLEVEL_OUTOF10: 4
PAINLEVEL_OUTOF10: 4

## 2024-07-16 NOTE — PROGRESS NOTES
Post-Partum Vaginal Delivery Note    Radha Devine  [id number]  1985  39 y.o.    S:  Ms. Radha Devine is a 39 y.o.  PPD #1 s/p  @ 40w0d.  Doing well.  She had a baby girl.  Her lochia is like a period.  She describes her pain as mild and is well controlled with PO medications.  She is breast feeding and supplementing with formula feeds.  She is ambulating and voiding.  Tolerating PO intake.      O:   /60   Pulse 84   Temp 98.2 °F (36.8 °C)   Resp 16   SpO2 98%   Breastfeeding Unknown     Gen - No acute distress  Respirations - nonlabored   Abdomen - Fundus firm below the umbilicus   Ext - Warm, well perfused, nontender     Lab Results   Component Value Date/Time    WBC 11.5 07/15/2024 08:37 AM    HGB 10.2 07/15/2024 08:37 AM    HCT 31.2 07/15/2024 08:37 AM     07/15/2024 08:37 AM    MCV 86.9 07/15/2024 08:37 AM         A/P:  39 y.o.  PPD #1 s/p  @ 40w0d doing well.    1.  Routine PP instructions/ care discussed  2.  Blood type - Rh pos  3.  Rubella immune  4.  Circumcision n/a   5.  Discharge PPD1-2   6.  F/U 4-6 weeks for PP check.      Lisbeth Nation MD  [facility]

## 2024-07-16 NOTE — CARE COORDINATION
7/16/2024  3:59 PM    CM met with MCKENZIE to complete initial assessment and begin discharge planning.  MOB verified and confirmed demographics.  MCKENZIE lives with FOB, at the address on file. MCKENZIE reports she has good family support, and feels like she has the support she needs when she returns home.  MCKENZIE plans to breast feed baby and has pump to use at home.  [facility] will provide follow up care for infant. MCKENZIE has car seat, bassinet/crib, clothing, bottles and all necessary supplies for baby. MCKENZIE has [employer] commercial insurance, and will be adding baby to this policy. CM discussed process to add baby to insurance, MCKENZIE verbalized understanding.     07/16/24 4136   Service Assessment   Patient Orientation Alert and Oriented   Cognition Alert   History Provided By Patient   Primary Caregiver Self   Support Systems Spouse/Significant Other   PCP Verified by CM Yes   Last Visit to PCP Within last 3 months   Prior Functional Level Independent in ADLs/IADLs   Current Functional Level Independent in ADLs/IADLs   Can patient return to prior living arrangement Yes   Ability to make needs known: Good   Family able to assist with home care needs: Yes   Would you like for me to discuss the discharge plan with any other family members/significant others, and if so, who? No   Financial Resources Other (Comment)     Raúl Foy CM    
None

## 2024-07-16 NOTE — LACTATION NOTE
This note was copied from a baby's chart.  Experienced breastfeeding mother. This is her 5th baby to breastfeed. Mother has a breast pump for home use.    Discussed with mother her plan for feeding.  Reviewed the benefits of exclusive breast milk feeding during the hospital stay.   Informed her of the risks of using formula to supplement in the first few days of life as well as the benefits of successful breast milk feeding; referred her to the Breastfeeding booklet about this information.   She acknowledges understanding of information reviewed and states that it is her plan to breastfeed her infant.  Will support her choice and offer additional information as needed.     Mother will successfully establish breastfeeding by feeding in response to early feeding cues   or wake every 3h, will obtain deep latch, and will keep log of feedings/output.  Taught to BF at hunger cues and or q 2-3 hrs and to offer 10-20 drops of hand expressed colostrum at any non-feeds.                  Position and Latch: Independently                  Breast Care: Bra on, Lanolin provided, Nursing pads     Lactation Comment: Baby breast fed at 1550 for 10 minutes. Encouraged mother to call LC for breastfeeding assistance.

## 2024-07-17 VITALS
DIASTOLIC BLOOD PRESSURE: 78 MMHG | RESPIRATION RATE: 15 BRPM | SYSTOLIC BLOOD PRESSURE: 121 MMHG | TEMPERATURE: 97.9 F | OXYGEN SATURATION: 98 % | HEART RATE: 87 BPM

## 2024-07-17 PROCEDURE — 6370000000 HC RX 637 (ALT 250 FOR IP): Performed by: STUDENT IN AN ORGANIZED HEALTH CARE EDUCATION/TRAINING PROGRAM

## 2024-07-17 PROCEDURE — 94761 N-INVAS EAR/PLS OXIMETRY MLT: CPT

## 2024-07-17 RX ADMIN — DOCUSATE SODIUM 100 MG: 100 CAPSULE, LIQUID FILLED ORAL at 10:06

## 2024-07-17 RX ADMIN — IBUPROFEN 800 MG: 800 TABLET, FILM COATED ORAL at 10:05

## 2024-07-17 RX ADMIN — ACETAMINOPHEN 1000 MG: 500 TABLET ORAL at 06:58

## 2024-07-17 ASSESSMENT — PAIN DESCRIPTION - LOCATION
LOCATION: PERINEUM
LOCATION: ABDOMEN;PERINEUM

## 2024-07-17 ASSESSMENT — PAIN DESCRIPTION - DESCRIPTORS
DESCRIPTORS: SORE
DESCRIPTORS: ACHING;SORE

## 2024-07-17 ASSESSMENT — PAIN SCALES - GENERAL
PAINLEVEL_OUTOF10: 3
PAINLEVEL_OUTOF10: 5

## 2024-07-17 ASSESSMENT — PAIN DESCRIPTION - ORIENTATION
ORIENTATION: MID;LOWER
ORIENTATION: LOWER

## 2024-07-17 NOTE — DISCHARGE INSTRUCTIONS
Discharge Instructions for Vaginal Delivery      Continue taking your prenatal vitamins if you are breastfeeding.    Follow-up care is a key part of your treatment and safety. Please schedule and keep appointments.  Follow-up with your primary OB in 6 weeks or earlier if indicated.    Activity  Avoid anything in your vagina for 6 weeks (no intercourse, tampons, or douching).  You may drive unless you are taking prescription pain medications.  Climbing stairs and light lifting are okay.  Please avoid excessive exercise, though walking is okay- you'll be tired!    Diet  Regular diet as tolerated.  Be sure to drink plenty of fluids if you are breastfeeding.    Wound care  If you have stitches, continue to rinse with a squirt bottle of warm water each time you void for about 7-10 days..  Your stitches will gradually dissolve over four to eight weeks.  Sitz baths are also helpful to keep the wound clean, encourage healing, and to help with pain associated with the stitches or hemorrhoids.  You can use either a sitz bath basin or a bathtub filled with 2-3\" inches of plain warm water.  Soak for 10 minutes 3 times a day as tolerated.    Pain Management  Over the counter medications such as Tylenol and ibuprofen (Motrin or Advil) are ideal.  These may be taken together, alternating doses.  You may  take the maximum dose:  Motrin or Advil (generic ibuprofen), either 3 tablets every 6 hours or 4 tablets every 8 hours or Tylenol (acetominophen) 1000mg every 6 hours (equivalent to 2 extra strength Tylenol).  You may also have a precrescription for stronger pain medication.  Take only as needed and transition to over the counter medication in the next few days. Minimize amounts of the prescription medication, as it can be habit-forming and will worsen or cause constipation. Most patients will find that within a couple of days, their pain is adequately controlled using only over-the-counter medications.  The prescription pain

## 2024-07-17 NOTE — ANESTHESIA POSTPROCEDURE EVALUATION
Department of Anesthesiology  Postprocedure Note    Patient: Radha Devine  MRN: [id number]  YOB: 1985  Date of evaluation: 7/17/2024    Procedure Summary       Date: 07/15/24 Room / Location:     Anesthesia Start: 1052 Anesthesia Stop: 1513    Procedure: Labor Analgesia Diagnosis:     Scheduled Providers:  Responsible Provider: Fannie Robles MD    Anesthesia Type: epidural ASA Status: 2            Anesthesia Type: No value filed.    Vsen Phase I:      Sven Phase II:      Anesthesia Post Evaluation    Patient location during evaluation: PACU  Patient participation: complete - patient participated  Level of consciousness: awake  Airway patency: patent  Nausea & Vomiting: no vomiting and no nausea  Cardiovascular status: hemodynamically stable  Respiratory status: acceptable  Hydration status: stable  Pain management: adequate    No notable events documented.

## 2024-07-17 NOTE — PROGRESS NOTES
[facility] Note    Radha Devine  [id number]  1985  39 y.o.    S:  Ms. Radha Devine is a 39 y.o.  PPD #2 s/p  @ 40w0d.  Doing well.  She had a baby girl.  Her lochia is like a period.  She describes her pain as mild and is well controlled with PO medications.  She is breast feeding and this is going well.  She is ambulating and voiding.  Tolerating PO intake.      O:   /78   Pulse 87   Temp 97.9 °F (36.6 °C) (Oral)   Resp 15   SpO2 98%   Breastfeeding Unknown     Gen - No acute distress  Respirations - nonlabored   Abdomen - Fundus firm below the umbilicus   Ext - Warm, well perfused, nontender     Lab Results   Component Value Date/Time    WBC 11.5 07/15/2024 08:37 AM    HGB 10.2 07/15/2024 08:37 AM    HCT 31.2 07/15/2024 08:37 AM     07/15/2024 08:37 AM    MCV 86.9 07/15/2024 08:37 AM         A/P:  PPD #2 s/p  @ 40w0d doing well.    -  Routine PP instructions/ care discussed  -  Blood type - Rh pos   -  Anemia mild, Hct > 30, continue PNV  - Discharge today   -  F/U 4-6 weeks for PP check.      Eva Long MD  [facility]

## 2024-07-17 NOTE — LACTATION NOTE
This note was copied from a baby's chart.  Mom states breast feeding going well.  Baby at breast now. Lips flanged, intermittent sucking noted with occasional swallow.    Pt will successfully establish breastfeeding by feeding in response to early feeding cues   or wake every 3h, will obtain deep latch, and will keep log of feedings/output.  Taught to BF at hunger cues and or q 2-3 hrs and to offer 10-20 drops of hand expressed colostrum at any non-feeds.        Discussed breast feeding discharge information with parents.  Pt will successfully establish breastfeeding by feeding in response to early feeding cues   or wake every 3h, will obtain deep latch, and will keep log of feedings/output.  Taught to BF at hunger cues and or q 2-3 hrs and to offer 10-20 drops of hand expressed colostrum at any non-feeds.      Left Breast: Soft  Left Nipple: Protrude  Right Nipple: Protrude  Right Breast: Soft  Position and Latch: Independently  Signs of Transfer: Uterine cramping, Nutritive sucking  Maternal Response: Relaxed and confident, Attentive, Comfortable           Latch: Grasps breast, tongue down, lips flanged, rhythmic sucking  Audible Swallowing: Spontaneous and intermittent (24 hours old)  Type of Nipple: Everted (after stimulation)  Comfort (Breast/Nipple): Filling, red/small blisters/bruises, mild/mod discomfort (slight tenderness)  Hold (Positioning): No assist from staff, mother able to position/hold infant  LATCH Score: 9  Breast Care: Bra on, Lanolin provided, Nursing pads, Other (Comment) (nipple therapy pads)     Lactation Comment: Baby breast fed at 1550 for 10 minutes. Encouraged mother to call  for breastfeeding assistance.      Breast Feeding Discharge Information discussed:    Chart shows numerous feedings, void, stool WNL.  Discussed Importance of monitoring outputs and feedings on first week of  Breastfeeding.  Discussed ways to tell if baby getting enough, ie  Voids and stools, by day 7, baby should